# Patient Record
Sex: FEMALE | Employment: OTHER | URBAN - METROPOLITAN AREA
[De-identification: names, ages, dates, MRNs, and addresses within clinical notes are randomized per-mention and may not be internally consistent; named-entity substitution may affect disease eponyms.]

---

## 2018-05-17 ENCOUNTER — OFFICE VISIT (OUTPATIENT)
Dept: INTERNAL MEDICINE | Facility: CLINIC | Age: 73
End: 2018-05-17
Payer: COMMERCIAL

## 2018-05-17 VITALS
HEART RATE: 56 BPM | OXYGEN SATURATION: 97 % | WEIGHT: 128.3 LBS | SYSTOLIC BLOOD PRESSURE: 112 MMHG | DIASTOLIC BLOOD PRESSURE: 67 MMHG

## 2018-05-17 DIAGNOSIS — I10 BENIGN ESSENTIAL HYPERTENSION: ICD-10-CM

## 2018-05-17 DIAGNOSIS — Z00.00 ENCOUNTER FOR HEALTH MAINTENANCE EXAMINATION: ICD-10-CM

## 2018-05-17 DIAGNOSIS — Z79.4 TYPE 2 DIABETES MELLITUS WITHOUT COMPLICATION, WITH LONG-TERM CURRENT USE OF INSULIN (H): Primary | ICD-10-CM

## 2018-05-17 DIAGNOSIS — E11.9 TYPE 2 DIABETES MELLITUS WITHOUT COMPLICATION, WITH LONG-TERM CURRENT USE OF INSULIN (H): Primary | ICD-10-CM

## 2018-05-17 RX ORDER — ASPIRIN 81 MG/1
81 TABLET ORAL
COMMUNITY
Start: 2018-04-18

## 2018-05-17 RX ORDER — METOPROLOL TARTRATE 50 MG
50 TABLET ORAL
COMMUNITY
Start: 2018-03-03 | End: 2018-06-20

## 2018-05-17 RX ORDER — ERGOCALCIFEROL 1.25 MG/1
50000 CAPSULE, LIQUID FILLED ORAL
COMMUNITY
Start: 2017-05-25 | End: 2021-04-16

## 2018-05-17 RX ORDER — LOSARTAN POTASSIUM 25 MG/1
25 TABLET ORAL
COMMUNITY
Start: 2018-04-04 | End: 2019-08-08

## 2018-05-17 RX ORDER — ATORVASTATIN CALCIUM 10 MG/1
10 TABLET, FILM COATED ORAL
COMMUNITY
Start: 2018-04-04 | End: 2019-08-08

## 2018-05-17 RX ORDER — GLIMEPIRIDE 1 MG/1
1 TABLET ORAL
COMMUNITY
Start: 2018-04-04 | End: 2018-06-20

## 2018-05-17 RX ORDER — HYDROCHLOROTHIAZIDE 25 MG/1
12.5 TABLET ORAL
COMMUNITY
Start: 2018-03-03 | End: 2018-06-20

## 2018-05-17 ASSESSMENT — PAIN SCALES - GENERAL: PAINLEVEL: MODERATE PAIN (5)

## 2018-05-17 NOTE — NURSING NOTE
Chief Complaint   Patient presents with     Establish Care     Patient is here to establish a new PCP.      Susan Johns LPN at 1:03 PM on 5/17/2018.

## 2018-05-17 NOTE — PATIENT INSTRUCTIONS
It was a pleasure seeing you in clinic. Today we discussed:   1) For your diabetes - please continue to take 1 mg of Glimepride and metformin. Please measure your blood sugars in the morning, 1-2 hours after meals and at nightime before bed.     2) Please stop taking hydrochlorothiazide. You will need to measure your blood pressure twice daily - once in the morning and once at night. Please measure the blood pressure 1-2 hours after you take your morning Losartan medication.     3) I would like you to return to visit in 2--3 weeks with labs. At this time we will consider a cognitive assessment as well as further management of your appetite and BP and diabetes.     Sincerely,  Kristen Mcallister     Please follow up with me in 2-3 weeks with labs

## 2018-05-17 NOTE — PROGRESS NOTES
PRIMARY CARE CENTER         HPI:       Shirley Lara is a 72 year old female with medical history of diabetes and hypertension who presents to establish care     1) Poor appetite   Patient is accompanied by her daughter who states she is simply not interested in eating food any longer. Denies constipation, burning or GERD like symptoms. No significant weight loss as far as family is aware although have not been measuring weight consistently. Not a smoker r ETOH user and no B symptoms appreciable either per patient.   She states when food appears, she is able to eat small quantities however not large as her family expects.   This AM for example, ate an egg and toast and then was full. Usually does not eat a large amount throughout the remainder of the day.     2) The family wishes to review her current medical conditions.     Past Medical History:   Diagnosis Date     Hyperlipidemia      Hypertension      T2DM (type 2 diabetes mellitus) (H)        History reviewed. No pertinent surgical history.    Family History   Problem Relation Age of Onset     Family history unknown: Yes       Social History   Substance Use Topics     Smoking status: Not on file     Smokeless tobacco: Not on file     Alcohol use Not on file     Current Medications:   Metformin 100mg BID, AMaryl 2mg PO once daily   Lipitor 10mg PO Once daily   HCTZ 12.5mg Qd, Cozaar 25mg Qday, Lopressor 50mg 1 tab BID   Vitamin D   Aspirin 81mg Q Day     Medications and Allergies reviewed.          Review of Systems:   Review Of Systems  Skin: negative  Eyes: negative  Ears/Nose/Throat: negative for, nasal congestion, sneezing, postnasal drainage, hearing loss  Respiratory: No shortness of breath, dyspnea on exertion, cough, or hemoptysis  Cardiovascular: negative for, palpitations, irregular heart beat and chest pain  Gastrointestinal: poor appetite  Genitourinary: negative for, dysuria, frequency and urgency  Musculoskeletal: positive for back pain,  arthritis, joint pain and joint stiffness  Neurologic: negative  Psychiatric: negative  Hematologic/Lymphatic/Immunologic: negative  Endocrine: negative            Physical Exam:   /67  Pulse 56  Wt 58.2 kg (128 lb 4.8 oz)  SpO2 97%  There is no height or weight on file to calculate BMI.  Vitals were reviewed.     Gen: In no acute distress. Patient comfortably sitting on exam table in clinic.   HEENT: No scleral icterus, Moist mucous membranes. No nasal discharge.  CV: Normal rate, regular rhythm. S1/S2 normal.  No murmurs, rubs or gallops   Resp: Clear to auscultation bilaterally. Without wheeze or crackles  Abdomen: Soft, non tender abdomen, normal active bowel sounds,   Extremities: No peripheral edema, warm, capillary refill < 2 seconds  Skin: without rash or trauma on exposed areas.   Neuro:   Oriented to - Situation Able to ambulate appropriately without gait imbalance within exam room. Ab le to get up from chair with ease without use of upper extremities       Results:     Chart Reviewed     Assessment and Plan     Shirley was seen today for establish care.    Diagnoses and all orders for this visit:    Type 2 diabetes mellitus without complication, with long-term current use of insulin (H)  Currently glucose levels appear elevated in PM per home records. Family has been diligent with respect to measuring blood glucose before and after meals as well as at bedtime. Bedtime values appear in low 300s and AM values at 160-200.   Is on Metformin as well as Glimepride recently decreased to 1mg given symptomatic dizziness and hypoglycemia.   Will not make further changes at this time, Recommend continuing current agents. Will readdress at next visit. Lindy that patient's ethnic foods cause component of hyperglycemia as well. Last saw Diabetes education in 2017.   -     DIABETES EDUCATOR REFERRAL    Benign essential hypertension  BP appears low today and in combination with symptomatic Dizziness, will stop  HCTZ and continue remainder of anti HTN agents.   - Asked family to measure B BID daily and record and bring to next appointment   -     Basic metabolic panel; Future  -     Magnesium; Future    Encounter for health maintenance examination  -     CBC with platelets; Future    Options for treatment and follow-up care were reviewed with the patient. Shirley Lara engaged in the decision making process and verbalized understanding of the options discussed and agreed with the final plan.    Kristen Mcallister MD  May 17, 2018    Pt was discussed with     While the patient was in clinic, I reviewed the pertinent medical history and results.  I discussed the current findings on physical examination, as well as the patient s diagnosis and treatment plan with the resident and agree with the information as documented with the following exceptions: none.  Sophie Jones MD

## 2018-06-01 ENCOUNTER — OFFICE VISIT (OUTPATIENT)
Dept: INTERNAL MEDICINE | Facility: CLINIC | Age: 73
End: 2018-06-01
Payer: COMMERCIAL

## 2018-06-01 VITALS
SYSTOLIC BLOOD PRESSURE: 152 MMHG | HEART RATE: 60 BPM | OXYGEN SATURATION: 99 % | WEIGHT: 131 LBS | DIASTOLIC BLOOD PRESSURE: 77 MMHG

## 2018-06-01 DIAGNOSIS — E11.9 TYPE 2 DIABETES MELLITUS WITHOUT COMPLICATION, WITHOUT LONG-TERM CURRENT USE OF INSULIN (H): ICD-10-CM

## 2018-06-01 DIAGNOSIS — Z11.59 NEED FOR HEPATITIS C SCREENING TEST: ICD-10-CM

## 2018-06-01 DIAGNOSIS — I10 BENIGN ESSENTIAL HYPERTENSION: ICD-10-CM

## 2018-06-01 DIAGNOSIS — Z00.00 ENCOUNTER FOR HEALTH MAINTENANCE EXAMINATION: ICD-10-CM

## 2018-06-01 DIAGNOSIS — D64.9 LOW HEMOGLOBIN: ICD-10-CM

## 2018-06-01 DIAGNOSIS — R63.0 LOSS OF APPETITE: Primary | ICD-10-CM

## 2018-06-01 DIAGNOSIS — R63.0 LOSS OF APPETITE: ICD-10-CM

## 2018-06-01 PROBLEM — E78.5 HYPERLIPIDEMIA, UNSPECIFIED: Status: ACTIVE | Noted: 2017-05-25

## 2018-06-01 PROBLEM — E78.5 HYPERLIPIDEMIA LDL GOAL <100: Status: ACTIVE | Noted: 2018-06-01

## 2018-06-01 PROBLEM — E78.5 HYPERLIPIDEMIA, UNSPECIFIED: Status: RESOLVED | Noted: 2017-05-25 | Resolved: 2018-06-01

## 2018-06-01 LAB
ALBUMIN SERPL-MCNC: 3.6 G/DL (ref 3.4–5)
ALP SERPL-CCNC: 70 U/L (ref 40–150)
ALT SERPL W P-5'-P-CCNC: 25 U/L (ref 0–50)
ANION GAP SERPL CALCULATED.3IONS-SCNC: 8 MMOL/L (ref 3–14)
AST SERPL W P-5'-P-CCNC: 13 U/L (ref 0–45)
BILIRUB DIRECT SERPL-MCNC: <0.1 MG/DL (ref 0–0.2)
BILIRUB SERPL-MCNC: 0.4 MG/DL (ref 0.2–1.3)
BUN SERPL-MCNC: 23 MG/DL (ref 7–30)
CALCIUM SERPL-MCNC: 9.5 MG/DL (ref 8.5–10.1)
CHLORIDE SERPL-SCNC: 104 MMOL/L (ref 94–109)
CO2 SERPL-SCNC: 27 MMOL/L (ref 20–32)
CREAT SERPL-MCNC: 0.92 MG/DL (ref 0.52–1.04)
ERYTHROCYTE [DISTWIDTH] IN BLOOD BY AUTOMATED COUNT: 13.2 % (ref 10–15)
GFR SERPL CREATININE-BSD FRML MDRD: 60 ML/MIN/1.7M2
GLUCOSE SERPL-MCNC: 143 MG/DL (ref 70–99)
HCT VFR BLD AUTO: 31.5 % (ref 35–47)
HGB BLD-MCNC: 10.4 G/DL (ref 11.7–15.7)
MAGNESIUM SERPL-MCNC: 1.6 MG/DL (ref 1.6–2.3)
MCH RBC QN AUTO: 28.3 PG (ref 26.5–33)
MCHC RBC AUTO-ENTMCNC: 33 G/DL (ref 31.5–36.5)
MCV RBC AUTO: 86 FL (ref 78–100)
PLATELET # BLD AUTO: 199 10E9/L (ref 150–450)
POTASSIUM SERPL-SCNC: 4.3 MMOL/L (ref 3.4–5.3)
PROT SERPL-MCNC: 6.8 G/DL (ref 6.8–8.8)
RBC # BLD AUTO: 3.67 10E12/L (ref 3.8–5.2)
SODIUM SERPL-SCNC: 139 MMOL/L (ref 133–144)
TSH SERPL DL<=0.005 MIU/L-ACNC: 2.44 MU/L (ref 0.4–4)
WBC # BLD AUTO: 5.3 10E9/L (ref 4–11)

## 2018-06-01 ASSESSMENT — PAIN SCALES - GENERAL: PAINLEVEL: MILD PAIN (2)

## 2018-06-01 NOTE — PROGRESS NOTES
"[facility]  Primary Care [place]   Angie NAMJesenia Jaircassandraleonardo, RAMONA CNP  06/01/2018      Chief Complaint:   Results and Diabetes       History of Present Illness: The patient's clinic visit was completed with the assistance of a [place] .  Shirley Lara is a 72 year old female with a history of type 2 diabetes, hypertension, and hyperlipidemia, who presents with her daughter for laboratory results and diabetes follow up.    Blood pressure- At her visit on 05/17/2018, her blood pressure was at 112/67, therefore Dr. Duran advised her to discontinue hydrochlorothiazide which had been causing excess urination as a side affect. The patient is regularly checking her blood pressure at home. Her current systolic pressure range is 130s-180 with an average in the 140s-150s. Her diastolic pressure has been in the range of 62-86 with an average in the 60's. Of note, the patient is still taking 25 mg losartan as well as 50 mg metoprolol.     Appetite- She is experiencing a lack of appetite which she associates with worry and anxiety about her blood sugar levels. The patient reports feeling nauseous and having abdominal cramping with \"colic.\" She does have normal bowel movements, and denies hard stool. She also denies burning/acid reflux as well as urinary symptoms. Denies weight loss.    Blood Glucose- The patient's HbA1c was found to be 6.5 in April 2018. Her glimepiride prescription was recently decreased from 2 mg to 1 mg. She has been consistently recording her blood glucose levels at home, which are about 119-160 before breakfast, 212 before lunch, 133 after lunch, and 227-315 at bedtime. The patient is to see Loulou Romero of diabetes education on 06/18/2018.    Hemoglobin- Today, the patients hemoglobin is low at 10.4. She is complaining of being cold and reports having dizzy episodes described as lightheadedness that onset suddenly. During one of these \"dizzy\" episodes, the patient fell in the restroom " which resulted in bruising her right ankle.    Memory loss- Her daughter reports that the patient is showing symptoms of memory loss. Shirley states that she just doesn't like to talk very much, and that is just who she is.    Health Care Maintenance/Other:  1. Occasional headaches   2. Occasional yellowing of the eyes per daughter's report  3. Laboratory studies completed prior to visit    Review of Systems:   Pertinent items are noted in HPI, remainder of complete ROS is negative.      Active Medications:      aspirin 81 MG EC tablet, Take 81 mg by mouth, Disp: , Rfl:      atorvastatin (LIPITOR) 10 MG tablet, Take 10 mg by mouth, Disp: , Rfl:      glimepiride (AMARYL) 1 MG tablet, Take 1 mg by mouth, Disp: , Rfl:      hydrochlorothiazide (HYDRODIURIL) 25 MG tablet, Take 12.5 mg by mouth, Disp: , Rfl:      losartan (COZAAR) 25 MG tablet, Take 25 mg by mouth, Disp: , Rfl:      metFORMIN (GLUCOPHAGE) 500 MG tablet, Take 1,000 mg by mouth, Disp: , Rfl:      metoprolol tartrate (LOPRESSOR) 50 MG tablet, Take 50 mg by mouth, Disp: , Rfl:      vitamin D (ERGOCALCIFEROL) 65540 UNIT capsule, Take 50,000 Units by mouth, Disp: , Rfl:       Allergies:   No known allergies     Past Medical History:  Type 2 diabetes mellitus   Hyperlipidemia    Benign essential hypertension      Past Surgical History:  Hernia repair    Family History:   The patient's family history is unknown as she is adopted.      Social History:   The patient is  and has one daughter.       Physical Exam:   /77  Pulse 60  Wt 59.4 kg (131 lb)  SpO2 99%  Breastfeeding? No      Constitutional: Alert, oriented, pleasant, no acute distress  Head: Normocephalic, atraumatic  Eyes: Extra-ocular movements intact, pupils equally round and reactive bilaterally, no scleral icterus  Ears: tympanic membranes pearly gray with positive light reflex  ENT: Oropharynx clear, moist mucus membranes, wears upper dentures.   Neck: Supple, no lymphadenopathy, no  "thyromegaly  Cardiovascular: Regular rate and rhythm, no murmurs, rubs or gallops, peripheral pulses full/symmetric  Respiratory: Good air movement bilaterally, lungs clear, no wheezes/rales/rhonchi  GI: Abdomen soft, bowel sounds present, nondistended, nontender, no organomegaly or masses, no rebound/guarding  Neurologic: Alert and oriented, cranial nerves grossly intact.  Psychiatric: normal mentation, affect and mood    Laboratory Results:  Component      Latest Ref Rng & Units 6/1/2018   Sodium      133 - 144 mmol/L 139   Potassium      3.4 - 5.3 mmol/L 4.3   Chloride      94 - 109 mmol/L 104   Carbon Dioxide      20 - 32 mmol/L 27   Anion Gap      3 - 14 mmol/L 8   Glucose      70 - 99 mg/dL 143 (H)   Urea Nitrogen      7 - 30 mg/dL 23   Creatinine      0.52 - 1.04 mg/dL 0.92   GFR Estimate      >60 mL/min/1.7m2 60 (L)   GFR Estimate If Black      >60 mL/min/1.7m2 72   Calcium      8.5 - 10.1 mg/dL 9.5   WBC      4.0 - 11.0 10e9/L 5.3   RBC Count      3.8 - 5.2 10e12/L 3.67 (L)   Hemoglobin      11.7 - 15.7 g/dL 10.4 (L)   Hematocrit      35.0 - 47.0 % 31.5 (L)   MCV      78 - 100 fl 86   MCH      26.5 - 33.0 pg 28.3   MCHC      31.5 - 36.5 g/dL 33.0   RDW      10.0 - 15.0 % 13.2   Platelet Count      150 - 450 10e9/L 199   Bilirubin Direct      0.0 - 0.2 mg/dL <0.1   Bilirubin Total      0.2 - 1.3 mg/dL 0.4   Albumin      3.4 - 5.0 g/dL 3.6   Protein Total      6.8 - 8.8 g/dL 6.8   Alkaline Phosphatase      40 - 150 U/L 70   ALT      0 - 50 U/L 25   AST      0 - 45 U/L 13   Magnesium      1.6 - 2.3 mg/dL 1.6   TSH      0.40 - 4.00 mU/L 2.44        Assessment and Plan:  1. Loss of appetite  Patient has experienced a decreased appetite with decreased PO intake to avoid elevated blood glucose levels. She has experienced abdominal cramping with nausea and \"colic\" as well , so hepatic panel and TSH were added on to earlier labs for further evaluation of underlying cause.   - Hepatic panel; Future  - TSH with free " T4 reflex; Future    2. Low hemoglobin  Patient hemoglobin prior to the appointment was found to be low at 10.4. Declines colonoscopy at this point. Due to recent lightheadedness and concern for anemia, FIT testing was ordered for evaluate for bloody stools.   - Fecal cancer screen FIT; Future    3. Need for hepatitis C screening test  - HCV Screen with Reflex; Future    4. Type 2 diabetes mellitus without complication, without long-term current use of insulin (H)  Patient has experienced a range of values with an attempt to control with glimepiride. Patient has been decreasing her PO intake to avoid elevated levels. Reviewed importance of regular meals or small snacks if not feeling hungry. No documented low BGs, no s/s hypoglycemia.    5. Elevated blood pressure   Patient was recently taken off of hydrochlorothiazide as her blood pressure appeared well controlled and she did not like the excessive urination. Her blood pressure was elevated on presentation today and has been slightly elevated while monitoring at home. As she has been experiencing lightheadedness recently, will hold off on increasing her losartan at this time. Patient should continue to monitor these levels at home with the goal of ~130s/70s.      Follow-up: in 2-3 weeks        Scribe Disclosure:  We, Maryjo Clayton and Daphnie Glasgow, are serving as the scribes to document services personally performed by RAMONA Rothman CNP at this visit, based upon the provider's statements to me. All documentation has been reviewed by the aforementioned provider prior to being entered into the official medical record.     Portions of this medical record were completed by a scribe. UPON MY REVIEW AND AUTHENTICATION BY ELECTRONIC SIGNATURE, this confirms (a) I performed the applicable clinical services, and (b) the record is accurate.     RAMONA Rothman CNP

## 2018-06-01 NOTE — NURSING NOTE
Chief Complaint   Patient presents with     Results     Pt. is here to f/u on labs.     Diabetes     f/u.     Annia Khan

## 2018-06-01 NOTE — MR AVS SNAPSHOT
After Visit Summary   6/1/2018    Shirley Lara    MRN: [id number]           Patient Information     Date Of Birth          1945        Visit Information        Provider Department      6/1/2018 2:15 PM Andrea Chapa Jennie Lynn, APRN [facility]        Today's Diagnoses     Loss of appetite    -  1    Low hemoglobin        Need for hepatitis C screening test        Type 2 diabetes mellitus without complication, without long-term current use of insulin (H)          Care Instructions    Primary Care Center Phone Number [phone]  Primary Care Center Medication Refill Request Information:  * Please contact your pharmacy regarding ANY request for medication refills.  ** [facility] Prescription Fax = [phone]  * Please allow 3 business days for routine medication refills.  * Please allow 5 business days for controlled substance medication refills.     Primary Care Center Test Result notification information:  *You will be notified with in 7-10 days of your appointment day regarding the results of your test.  If you are on MyChart you will be notified as soon as the provider has reviewed the results and signed off on them.                    Follow-ups after your visit        Your next 10 appointments already scheduled     Jun 18, 2018  2:00 PM CDT   LAB with UC LAB   [facility] ([facility])    [address] [place] [address] [phone]           Please do not eat 10-12 hours before your appointment if you are coming in fasting for labs on lipids, cholesterol, or glucose (sugar). This does not apply to pregnant women. Water, hot tea and black coffee (with nothing added) are okay. Do not drink other fluids, diet soda or chew gum.            Jun 18, 2018  2:30 PM CDT   (Arrive by 2:15 PM)   Office Visit with Jacqueline Romero RD   [facility] ([facility])    [address] [place] [address]  Floor  [facility] [address] [phone]           Bring a current list of meds and any records pertaining to this visit. For Physicals, please bring immunization records and any forms needing to be filled out. Please arrive 10 minutes early to complete paperwork.            2018  2:30 PM CDT   (Arrive by 2:15 PM)   Return Visit with RAMONA [facility] ([facility])    [address] [facility] [address] [facility]  4th Floor  [facility] [address] [phone]              Future tests that were ordered for you today     Open Future Orders        Priority Expected Expires Ordered    HCV Screen with Reflex Add-On 2018    Fecal cancer screen FIT Routine 2018            Who to contact     Please call your clinic at [phone] to:    Ask questions about your health    Make or cancel appointments    Discuss your medicines    Learn about your test results    Speak to your doctor            Additional Information About Your Visit        [employer] Information     [employer] is an electronic gateway that provides easy, online access to your medical records. With [employer], you can request a clinic appointment, read your test results, renew a prescription or communicate with your care team.     To sign up for [employer] visit the website at [email or web address]   You will be asked to enter the access code listed below, as well as some personal information. Please follow the directions to create your username and password.     Your access code is: [email or web address]  Expires: 2018  3:40 PM     Your access code will  in 90 days. If you need help or a new code, please contact your [facility] or call [phone] for assistance.        Care EveryWhere ID     This is your Care EveryWhere ID. This could be used by other organizations to access your [place] medical records  [id number]         Your Vitals Were     Pulse Pulse Oximetry Breastfeeding?             60 99% No          Blood Pressure from Last 3 Encounters:   06/01/18 152/77   05/17/18 112/67    Weight from Last 3 Encounters:   06/01/18 59.4 kg (131 lb)   05/17/18 58.2 kg (128 lb 4.8 oz)               Primary Care Provider Office Phone # Fax #    Kristen Mcallister [phone]       [place] [address]        Equal Access to Services     YOESLYN PARHAM : Hadii aad ku hadasho Soomaali, waaxda luqadaha, qaybta kaalmada adeegyada, waxdre jauregui hayadin benson . So [place] -[phone].    ATENCIÓN: Si habla español, tiene a stout disposición servicios gratuitos de asistencia lingüística. [facility] [phone].    We comply with applicable federal civil rights laws and [place] laws. We do not discriminate on the basis of race, color, national origin, age, disability, sex, sexual orientation, or gender identity.            Thank you!     Thank you for choosing [facility]  for your care. Our goal is always to provide you with excellent care. Hearing back from our patients is one way we can continue to improve our services. Please take a few minutes to complete the written survey that you may receive in the mail after your visit with us. Thank you!             Your Updated Medication List - Protect others around you: Learn how to safely use, store and throw away your medicines at www.disposemymeds.org.          This list is accurate as of 6/1/18  3:40 PM.  Always use your most recent med list.                   Brand Name Dispense Instructions for use Diagnosis    aspirin 81 MG EC tablet      Take 81 mg by mouth        atorvastatin 10 MG tablet    LIPITOR     Take 10 mg by mouth        glimepiride 1 MG tablet    AMARYL     Take 1 mg by mouth        hydrochlorothiazide 25 MG tablet    HYDRODIURIL     Take 12.5 mg by mouth        losartan 25 MG tablet    COZAAR     Take 25 mg  by mouth        metFORMIN 500 MG tablet    GLUCOPHAGE     Take 1,000 mg by mouth        metoprolol tartrate 50 MG tablet    LOPRESSOR     Take 50 mg by mouth        vitamin D 79891 UNIT capsule    ERGOCALCIFEROL     Take 50,000 Units by mouth

## 2018-06-01 NOTE — PATIENT INSTRUCTIONS
Primary Care Center Phone Number [phone]  Primary Care Center Medication Refill Request Information:  * Please contact your pharmacy regarding ANY request for medication refills.  ** [facility] Prescription Fax = [phone]  * Please allow 3 business days for routine medication refills.  * Please allow 5 business days for controlled substance medication refills.     Primary Care Center Test Result notification information:  *You will be notified with in 7-10 days of your appointment day regarding the results of your test.  If you are on MyChart you will be notified as soon as the provider has reviewed the results and signed off on them.

## 2018-06-07 ENCOUNTER — HOSPITAL ENCOUNTER (OUTPATIENT)
Facility: CLINIC | Age: 73
Setting detail: SPECIMEN
Discharge: HOME | End: 2018-06-07
Admitting: NURSE PRACTITIONER
Payer: COMMERCIAL

## 2018-06-07 PROCEDURE — 82274 ASSAY TEST FOR BLOOD FECAL: CPT | Performed by: NURSE PRACTITIONER

## 2018-06-09 DIAGNOSIS — D64.9 LOW HEMOGLOBIN: ICD-10-CM

## 2018-06-09 LAB — HEMOCCULT STL QL IA: NEGATIVE

## 2018-06-18 ENCOUNTER — OFFICE VISIT (OUTPATIENT)
Dept: EDUCATION SERVICES | Facility: CLINIC | Age: 73
End: 2018-06-18
Attending: INTERNAL MEDICINE
Payer: COMMERCIAL

## 2018-06-18 VITALS — HEIGHT: 57 IN | WEIGHT: 131.1 LBS | BODY MASS INDEX: 28.29 KG/M2

## 2018-06-18 DIAGNOSIS — E11.9 DIABETES MELLITUS WITHOUT COMPLICATION (H): Primary | ICD-10-CM

## 2018-06-18 DIAGNOSIS — Z11.59 NEED FOR HEPATITIS C SCREENING TEST: ICD-10-CM

## 2018-06-18 NOTE — MR AVS SNAPSHOT
After Visit Summary   6/18/2018    Shirley Lara    MRN: [id number]           Patient Information     Date Of Birth          1945        Visit Information        Provider Department      6/18/2018 2:15 PM Jacqueline Romero RD; [employer] [facility] Diabetes        Care Instructions    1. Take your diabetes medication with food, not on an empty stomach to prevent nausea  2. Make sure to drink plenty of water throughout the day, at least 6 cups per day  3. Eat regular meals with your family, you should be eating normal foods and not avoid any healthy foods such as grains or fruits because of your blood sugar.  4. You most likely need an increase in your diabetes medications. Discuss this with Montserrat Bragg NP in a couple days. Make sure you bring your blood sugar logbook with you to this appointment.  5. Follow up as needed  Jacqueline Romero RD, LD, CDE  Diabetes Care  [address]  [address] [place] [address]  [address]  Phone: [phone]  Appointment line: [phone]  Email: [email or web address]              Follow-ups after your visit        Your next 10 appointments already scheduled     Jun 20, 2018  2:15 PM CDT   Return Visit with RAMONA Cee [facility] ([facility])    [address] [place] [address] [phone]              Who to contact     Please call your clinic at [phone] to:    Ask questions about your health    Make or cancel appointments    Discuss your medicines    Learn about your test results    Speak to your doctor            Additional Information About Your Visit        MyChart Information     Cloudbot is an electronic gateway that provides easy, online access to your medical records. With Cloudbot, you can request a clinic appointment, read your test results, renew a prescription or communicate with your care team.      To sign up for TuneUp visit the website at [email or web address]   You will be asked to enter the access code listed below, as well as some personal information. Please follow the directions to create your username and password.     Your access code is: [email or web address]  Expires: 2018  3:40 PM     Your access code will  in 90 days. If you need help or a new code, please contact your [facility] or call [phone] for assistance.        Care EveryWhere ID     This is your Care EveryWhere ID. This could be used by other organizations to access your [place] medical records  [id number]         Blood Pressure from Last 3 Encounters:   18 152/77   18 112/67    Weight from Last 3 Encounters:   18 59.4 kg (131 lb)   18 58.2 kg (128 lb 4.8 oz)              Today, you had the following     No orders found for display       Primary Care Provider Office Phone # Fax #    Kristen Mcallister [phone]       [place] [address]        Equal Access to Services     YOSELYN PARHAM AH: Hadii lou fryeo Sonathaniel, waaxda luqadaha, qaybta kaalmada ademonicayada, zora pro. So [facility] [phone].    ATENCIÓN: Si habla español, tiene a stout disposición servicios gratuitos de asistencia lingüística. Luis al [phone].    We comply with applicable federal civil rights laws and [place] laws. We do not discriminate on the basis of race, color, national origin, age, disability, sex, sexual orientation, or gender identity.            Thank you!     Thank you for choosing [facility]  for your care. Our goal is always to provide you with excellent care. Hearing back from our patients is one way we can continue to improve our services. Please take a few minutes to complete the written survey that you may receive in the mail after your visit with us. Thank you!             Your Updated  Medication List - Protect others around you: Learn how to safely use, store and throw away your medicines at www.disposemymeds.org.          This list is accurate as of 6/18/18  3:38 PM.  Always use your most recent med list.                   Brand Name Dispense Instructions for use Diagnosis    aspirin 81 MG EC tablet      Take 81 mg by mouth        atorvastatin 10 MG tablet    LIPITOR     Take 10 mg by mouth        glimepiride 1 MG tablet    AMARYL     Take 1 mg by mouth        hydrochlorothiazide 25 MG tablet    HYDRODIURIL     Take 12.5 mg by mouth        losartan 25 MG tablet    COZAAR     Take 25 mg by mouth        metFORMIN 500 MG tablet    GLUCOPHAGE     Take 1,000 mg by mouth        metoprolol tartrate 50 MG tablet    LOPRESSOR     Take 50 mg by mouth        vitamin D 19219 UNIT capsule    ERGOCALCIFEROL     Take 50,000 Units by mouth

## 2018-06-18 NOTE — PROGRESS NOTES
"  Diabetes Self Management Training: Individual Review Visit    Shirley Lara presents today for education and evaluation of glucose control related to Type 2 diabetes.    She is accompanied by daughter and     Patient Problem List and Family Medical History reviewed for relevant medical history, current medical status, and diabetes risk factors.    Current Diabetes Management per Patient:  Taking diabetes medications?   yes:     Diabetes Medication(s)     Biguanides Sig    metFORMIN (GLUCOPHAGE) 500 MG tablet Take 1,000 mg by mouth    Sulfonylureas Sig    glimepiride (AMARYL) 1 MG tablet Take 1 mg by mouth          Past Diabetes Education: Yes    Patient glucose self monitoring as follows: 1-2 times daily.   BG results: fasting glucose- 154, post-breakfast glucose- 344, pre-lunch glucose- 210, 153, 226, 168, 215, post-lunch glucose- 253, 125, 195, pre-supper glucose- 173, post-supper glucose- 239 and bedtime- 273, 334, 220     Vitals:  Ht 1.454 m (4' 9.24\")  Wt 59.5 kg (131 lb 1.6 oz)  BMI 28.13 kg/m2  Estimated body mass index is 28.13 kg/(m^2) as calculated from the following:    Height as of this encounter: 1.454 m (4' 9.24\").    Weight as of this encounter: 59.5 kg (131 lb 1.6 oz).   Last 3 BP:   BP Readings from Last 3 Encounters:   06/01/18 152/77   05/17/18 112/67     History   Smoking Status     Not on file   Smokeless Tobacco     Not on file       Labs:  No results found for: A1C  Lab Results   Component Value Date     06/01/2018     No results found for: LDL  No results found for: HDL]  GFR Estimate   Date Value Ref Range Status   06/01/2018 60 (L) >60 mL/min/1.7m2 Final     Comment:     Non  GFR Calc     GFR Estimate If Black   Date Value Ref Range Status   06/01/2018 72 >60 mL/min/1.7m2 Final     Comment:      GFR Calc     Lab Results   Component Value Date    CR 0.92 06/01/2018     No results found for: MICROALBUMIN    Nutrition Review:  Shirley " is here today with her daughter and the [facility] . I have read her PMH. She has had her type 2 diabetes approx 10 years. [place] has been in the [place] 2[place] 5 years and lives with her  and daughter and family. Her daughter tells me today she is very worried about her appetite, and tells me she hardly eats anything. She tells me she thinks her weight has remained stable however for the past 2 years. She tells me her mother is worried that if she eats, her bg rise, in addition she tells me her mother is nauseated and dizzy often and this is another reason she does not eat. Her mother frequently takes her diabetes meds on an empty stomach. Shirley's other daughter does all of the cooking and prepares traditional meals for the family consisting of meat/chicken/veg/rice/soup but Shirley does not eat much of it. The daughter who is here today also tells me that her mother's memory is getting worse and she may not remember if she ate or not.    Diet Recall:   Breakfast:1 egg, tea with 1 tablespoon sugar or rarely 1/2 armando with cream/honey  AM snack:none  Lunch:skips or eats only a small amount of meat/chicken in sauce, sometimes yogurt,strawberries, cherries  Dinner:tea with 1 tablespoon sugar, sometimes cheese or egg    Eats out in restaurants: about never  Beverages: tea, water  ETOH: none     Physical Activity:    Not assessed.      Reviewed many things today in addition to current diet.   1. Shirley should be eating regular meals with the family. There is nothing she cannot eat because of her diabetes  2. She likely  Needs an increase in her diabetes medication as her bg are mostly above target range. This is normal for someone who has had diabetes for 10 years. She should discuss this with Anna Bragg in 2 days when she sees her.  3. Shirley needs to take her diabetes meds with food, as this may be contributing to nausea at times.  4. She needs to drink plenty of fluids throughout the day to prevent  dehydration resulting in dizziness. 6 cups/day.    All of this was discussed with Shirley and the daughter today in addition to that Shirley should follow up with the NP regarding her memory loss to see if this is an issue impacting her poor appetite.     Education provided today on:  AADE Self-Care Behaviors:  Healthy Eating: eat normal meals, all traditional foods served as reviewed today are appropriate  Taking Medication: medication increase is warranted, Shirley will f/u with Anna Bragg in 2 days    Pt verbalized understanding of concepts discussed and recommendations provided today.         ASSESSMENT: It is unclear why Shirley's appetite is poor, it could be due to poor memory, nausea, dizziness combined with fear of high blood sugars. She needs an increase in her diabetes meds, which I will defer to Anna Brgag as Shirley sees her in 2 days. Shirley's diet recall today is not consistent with weight maintenance. She is very likely eating more than is reported as it would be impossible to maintain her current weight with her current diet as the estimated calories are approx 600 per day, and she would losing weight rapidly if she were only eating 600 calories/per day. She may be eating more and not remembering. I will task this note to Anna Bragg today.       PLAN:  See Patient Instructions for co-developed, patient-stated behavior change goals.  AVS printed and provided to patient today.    FOLLOW-UP:  Follow-up as needed.   Chart routed to referring provider.    Time Spent: 60 minutes  Encounter Type: Individual    Any diabetes medication dose changes were made via the CDE Protocol and Collaborative Practice Agreement with the patient's referring provider. A copy of this encounter was shared with the provider.

## 2018-06-18 NOTE — PATIENT INSTRUCTIONS
1. Take your diabetes medication with food, not on an empty stomach to prevent nausea  2. Make sure to drink plenty of water throughout the day, at least 6 cups per day  3. Eat regular meals with your family, you should be eating normal foods and not avoid any healthy foods such as grains or fruits because of your blood sugar.  4. You most likely need an increase in your diabetes medications. Discuss this with Montserrat Bragg NP in a couple days. Make sure you bring your blood sugar logbook with you to this appointment.  5. Follow up as needed  Jacqueline Romero RD, LD, CDE  Diabetes Care  [address]  [address] [place] [address]  [address]  Phone: [phone]  Appointment line: [phone]  Email: [email or web address]

## 2018-06-19 LAB — HCV AB SERPL QL IA: NONREACTIVE

## 2018-06-20 ENCOUNTER — OFFICE VISIT (OUTPATIENT)
Dept: INTERNAL MEDICINE | Facility: CLINIC | Age: 73
End: 2018-06-20
Payer: COMMERCIAL

## 2018-06-20 VITALS
DIASTOLIC BLOOD PRESSURE: 74 MMHG | BODY MASS INDEX: 28.21 KG/M2 | HEART RATE: 54 BPM | SYSTOLIC BLOOD PRESSURE: 151 MMHG | WEIGHT: 131.5 LBS | OXYGEN SATURATION: 100 %

## 2018-06-20 DIAGNOSIS — H81.10 BENIGN PAROXYSMAL POSITIONAL VERTIGO, UNSPECIFIED LATERALITY: ICD-10-CM

## 2018-06-20 DIAGNOSIS — G44.211 INTRACTABLE EPISODIC TENSION-TYPE HEADACHE: ICD-10-CM

## 2018-06-20 DIAGNOSIS — E11.9 TYPE 2 DIABETES MELLITUS WITHOUT COMPLICATION, WITHOUT LONG-TERM CURRENT USE OF INSULIN (H): ICD-10-CM

## 2018-06-20 DIAGNOSIS — I10 ESSENTIAL HYPERTENSION: ICD-10-CM

## 2018-06-20 DIAGNOSIS — Z91.81 PERSONAL HISTORY OF FALL: ICD-10-CM

## 2018-06-20 DIAGNOSIS — R11.0 NAUSEA: ICD-10-CM

## 2018-06-20 DIAGNOSIS — E11.9 TYPE 2 DIABETES MELLITUS WITHOUT COMPLICATION, WITHOUT LONG-TERM CURRENT USE OF INSULIN (H): Primary | ICD-10-CM

## 2018-06-20 DIAGNOSIS — R42 DIZZINESS: ICD-10-CM

## 2018-06-20 LAB — HBA1C MFR BLD: 7.2 % (ref 0–5.6)

## 2018-06-20 RX ORDER — GLIMEPIRIDE 1 MG/1
2 TABLET ORAL DAILY
Qty: 60 TABLET | Refills: 1 | Status: SHIPPED | OUTPATIENT
Start: 2018-06-20 | End: 2018-07-06

## 2018-06-20 RX ORDER — METOPROLOL TARTRATE 50 MG
50 TABLET ORAL 2 TIMES DAILY
Qty: 60 TABLET | COMMUNITY
Start: 2018-06-20 | End: 2019-08-08

## 2018-06-20 RX ORDER — GLIMEPIRIDE 1 MG/1
1 TABLET ORAL DAILY
Qty: 60 TABLET | Refills: 1 | COMMUNITY
Start: 2018-06-20 | End: 2018-06-20

## 2018-06-20 ASSESSMENT — PAIN SCALES - GENERAL: PAINLEVEL: NO PAIN (0)

## 2018-06-20 NOTE — MR AVS SNAPSHOT
After Visit Summary   6/20/2018    Shirley Lara    MRN: [id number]           Patient Information     Date Of Birth          1945        Visit Information        Provider Department      6/20/2018 2:15 PM Andrea Chapa Jennie Lynn, RAMONA [facility]        Today's Diagnoses     Type 2 diabetes mellitus without complication, without long-term current use of insulin (H)    -  1    Essential hypertension        Dizziness        Nausea        Intractable episodic tension-type headache        Benign paroxysmal positional vertigo, unspecified laterality        Personal history of fall          Care Instructions    Primary Care Center Phone Number [phone]  Primary Care Center Medication Refill Request Information:  * Please contact your pharmacy regarding ANY request for medication refills.  ** [facility] Prescription Fax = [phone]  * Please allow 3 business days for routine medication refills.  * Please allow 5 business days for controlled substance medication refills.     Primary Care Center Test Result notification information:  *You will be notified with in 7-10 days of your appointment day regarding the results of your test.  If you are on MyChart you will be notified as soon as the provider has reviewed the results and signed off on them.                    Follow-ups after your visit        Additional Services     ENDOCRINOLOGY ADULT REFERRAL       Your provider has referred you to: [facility]: Endocrinology and Diabetes Clinic - [place] [phone]   [email or web address]      Please be aware that coverage of these services is subject to the terms and limitations of your health insurance plan.  Call member services at your health plan with any benefit or coverage questions.      Please bring the following to your appointment:    >>   Any x-rays, CTs or MRIs which have been performed.  Contact the facility where  "they were done to arrange for  prior to your scheduled appointment.    >>   List of current medications   >>   This referral request   >>   Any documents/labs given to you for this referral            PHYSICAL THERAPY REFERRAL       *This therapy referral will be filtered to a centralized scheduling office at [facility] and the patient will receive a call to schedule an appointment at a [place] location most convenient for them. *     [facility] provides Physical Therapy evaluation and treatment and many specialty services across the [place] system.  If requesting a specialty program, please choose from the list below.    If you have not heard from the scheduling office within 2 business days, please call [phone] for all locations, with the exception of [place], please call [phone] and [facility], please call [phone]  Treatment: Evaluation & Treatment  Special Instructions/Modalities:   Special Programs: Balance/Vestibular    Please be aware that coverage of these services is subject to the terms and limitations of your health insurance plan.  Call member services at your health plan with any benefit or coverage questions.      **Note to Provider:  If you are referring outside of [place] for the therapy appointment, please list the name of the location in the \"special instructions\" above, print the referral and give to the patient to schedule the appointment.                  Follow-up notes from your care team     Return in about 2 weeks (around 7/4/2018).      Your next 10 appointments already scheduled     Jun 26, 2018  1:20 PM CDT   CT HEAD W/O CONTRAST with UCCT1   [facility] CT ([facility])    [address]  1st Floor  [facility] [address] [phone]           Please bring any scans or X-rays taken at other hospitals, if similar tests were done. Also bring a list of your medicines, " including vitamins, minerals and over-the-counter drugs. It is safest to leave personal items at home.  Be sure to tell your doctor:   If you have any allergies.   If there s any chance you are pregnant.   If you are breastfeeding.  You do not need to do anything special to prepare for this exam.  Please wear loose clothing, such as a sweat suit or jogging clothes. Avoid snaps, zippers and other metal. We may ask you to undress and put on a hospital gown.            Jul 06, 2018  1:00 PM CDT   (Arrive by 12:45 PM)   ACUTE/CHRONIC SINGLE CONDITION with RAMONA Cee CNP   [facility] ([facility])    [address] [place] [address]  [address] [facility] [address] [phone]            Sep 05, 2018  1:30 PM CDT   (Arrive by 1:15 PM)   NEW DIABETES with Isis Henderson MD   [facility] ([facility])    [address] [place] [address] [phone]              Future tests that were ordered for you today     Open Future Orders        Priority Expected Expires Ordered    CT Head w/o contrast Routine  6/20/2019 6/20/2018    Hemoglobin A1c Routine 6/20/2018 7/4/2018 6/20/2018            Who to contact     Please call your clinic at [phone] to:    Ask questions about your health    Make or cancel appointments    Discuss your medicines    Learn about your test results    Speak to your doctor            Additional Information About Your Visit        MyChart Information     Spectraseis is an electronic gateway that provides easy, online access to your medical records. With [employer]raseis, you can request a clinic appointment, read your test results, renew a prescription or communicate with your care team.     To sign up for Spectraseis visit the website at [email or web address]   You will be asked to enter the access code listed below, as well as some personal information. Please follow the directions to create  your username and password.     Your access code is: [email or web address]  Expires: 2018  3:40 PM     Your access code will  in 90 days. If you need help or a new code, please contact your [facility] or call [phone] for assistance.        Care EveryWhere ID     This is your Care EveryWhere ID. This could be used by other organizations to access your [place] medical records  [id number]        Your Vitals Were     Pulse Pulse Oximetry BMI (Body Mass Index)             54 100% 28.21 kg/m2          Blood Pressure from Last 3 Encounters:   18 151/74   18 152/77   18 112/67    Weight from Last 3 Encounters:   18 59.6 kg (131 lb 8 oz)   18 59.5 kg (131 lb 1.6 oz)   18 59.4 kg (131 lb)              We Performed the Following     ENDOCRINOLOGY ADULT REFERRAL     PHYSICAL THERAPY REFERRAL          Today's Medication Changes          These changes are accurate as of 18  3:53 PM.  If you have any questions, ask your nurse or doctor.               Start taking these medicines.        Dose/Directions    glimepiride 1 MG tablet   Commonly known as:  AMARYL   Used for:  Type 2 diabetes mellitus without complication, without long-term current use of insulin (H)   Started by:  Angie Bragg APRN CNP        Dose:  2 mg   Take 2 tablets (2 mg) by mouth daily   Quantity:  60 tablet   Refills:  1         These medicines have changed or have updated prescriptions.        Dose/Directions    metFORMIN 500 MG tablet   Commonly known as:  GLUCOPHAGE   This may have changed:  when to take this   Used for:  Type 2 diabetes mellitus without complication, without long-term current use of insulin (H)   Changed by:  Angie Bragg APRN CNP        Dose:  1000 mg   Take 2 tablets (1,000 mg) by mouth 2 times daily (with meals)   Quantity:  120 tablet   Refills:  3       metoprolol tartrate 50 MG tablet   Commonly known as:  LOPRESSOR   This may have  changed:  when to take this   Changed by:  Angie Bragg APRN CNP        Dose:  50 mg   Take 1 tablet (50 mg) by mouth 2 times daily   Quantity:  60 tablet   Refills:  0            Where to get your medicines      These medications were sent to [facility]/pharmacy #[id number] - [place], [place] - [address] [place] [address]     Phone:  [phone]     glimepiride 1 MG tablet    metFORMIN 500 MG tablet                Primary Care Provider Office Phone # Fax #    Kristen Mcallister [phone]       [address] [address]        Equal Access to Services     YOSELYN PARHAM : Max fryeo Soroloali, waaxda luqadaha, qaybta kaalmada adeegyada, zora pro. So [facility] [phone].    ATENCIÓN: Si habla español, tiene a stout disposición servicios gratuitos de asistencia lingüística. [facility] [phone].    We comply with applicable federal civil rights laws and [place] laws. We do not discriminate on the basis of race, color, national origin, age, disability, sex, sexual orientation, or gender identity.            Thank you!     Thank you for choosing [facility]  for your care. Our goal is always to provide you with excellent care. Hearing back from our patients is one way we can continue to improve our services. Please take a few minutes to complete the written survey that you may receive in the mail after your visit with us. Thank you!             Your Updated Medication List - Protect others around you: Learn how to safely use, store and throw away your medicines at www.disposemymeds.org.          This list is accurate as of 6/20/18  3:53 PM.  Always use your most recent med list.                   Brand Name Dispense Instructions for use Diagnosis    aspirin 81 MG EC tablet      Take 81 mg by mouth        atorvastatin 10 MG tablet    LIPITOR     Take 10 mg by  mouth        glimepiride 1 MG tablet    AMARYL    60 tablet    Take 2 tablets (2 mg) by mouth daily    Type 2 diabetes mellitus without complication, without long-term current use of insulin (H)       losartan 25 MG tablet    COZAAR     Take 25 mg by mouth        metFORMIN 500 MG tablet    GLUCOPHAGE    120 tablet    Take 2 tablets (1,000 mg) by mouth 2 times daily (with meals)    Type 2 diabetes mellitus without complication, without long-term current use of insulin (H)       metoprolol tartrate 50 MG tablet    LOPRESSOR    60 tablet    Take 1 tablet (50 mg) by mouth 2 times daily        vitamin D 32972 UNIT capsule    ERGOCALCIFEROL     Take 50,000 Units by mouth

## 2018-06-20 NOTE — NURSING NOTE
Chief Complaint   Patient presents with     Nutrition Counseling     Pt. had consult with dietian and needs to follow up with provider.       Annia Asif LPN at 2:40 PM on 6/20/2018

## 2018-06-20 NOTE — PROGRESS NOTES
[facility]  Primary Care Center   RAMONA Rothman CNP  06/20/2018      Chief Complaint:   Nutrition Counseling       History of Present Illness:   Shirley Lara is a 72 year old female with a history of type 2 diabetes, hypertension, and hyperlipidemia who presents for evaluation of a follow up.    Blood Pressure:  In a previous visit on 06/01/18 with Shirley, she reported her systolic pressure range as 130s-180s with an average in the 140s. Her diastolic pressure ranged from 62-86 with an average in the 60's. The patient was recommended to continue monitoring her levels. Shirley has experienced more episodes of dizziness and high blood pressure since her previous visit. We did not adjust her BP medications d/t these intermittent dizzy episodes.    Appetite:  Previously, Shirley noted that she had a lack of appetite which she attributes to worrying about her blood sugar levels. A hepatic panel and TSH test were ordered. Her daughter reports today that her mother is still not eating well. I requested that she meet with a nutritionist; there is some question that she is not remembering what she is eating, as she continues to say she is not eating, but there has not been any sign of weight loss.     Blood Glucose:  The patient's BG levels have been in the 250-300s range. Shirley was scheduled to see Jacqueline Romero of diabetes education on 06/18/2018.    At Shirley's recent visit to the dietitian there was no limitations as to what she can eat. She is worried about her blood sugar and continues to have a poor appetite. Shirley has brought blood sugar recordings taken at home. She continues taking Metformin and Amaryl.   Hemoglobin:  At the previous visit, Shirley's hemoglobin levels were low at 10.4. She had beeen having dizzy episodes that appear suddenly. FIT testing was negative. Shirley notes today that she occasionally experiences headaches. She vomits intermittently due to the nausea. Her dizziness is still present  but intermittent. She does notice her dizziness symptoms occur when she sits up. Shirley reports that she does have a walker but she doesn't use it since she doesn't leave home often.     Review of Systems:   Pertinent items are noted in HPI, remainder of complete ROS is negative.      Active Medications:      aspirin 81 MG EC tablet, Take 81 mg by mouth, Disp: , Rfl:      atorvastatin (LIPITOR) 10 MG tablet, Take 10 mg by mouth, Disp: , Rfl:      glimepiride (AMARYL) 1 MG tablet, Take 1 mg by mouth, Disp: , Rfl:      hydrochlorothiazide (HYDRODIURIL) 25 MG tablet, Take 12.5 mg by mouth, Disp: , Rfl:      losartan (COZAAR) 25 MG tablet, Take 25 mg by mouth, Disp: , Rfl:      metFORMIN (GLUCOPHAGE) 500 MG tablet, Take 1,000 mg by mouth, Disp: , Rfl:      metoprolol tartrate (LOPRESSOR) 50 MG tablet, Take 50 mg by mouth, Disp: , Rfl:      vitamin D (ERGOCALCIFEROL) 77005 UNIT capsule, Take 50,000 Units by mouth, Disp: , Rfl:       Allergies:   Review of patient's allergies indicates no known allergies.      Past Medical History:  Hyperlipidemia  Hypertension  T2DM     Past Surgical History:  No past surgical history.    Family History:   Family History unknown: Yes      Social History:   The patient was accompanied to the appointment by her daughter and her .  Smoking Status: Never  Smokeless Tobacco: Never Used  Alcohol Use: No  Marital Status:       Physical Exam:   /74  Pulse 54  Wt 59.6 kg (131 lb 8 oz)  SpO2 100%  BMI 28.21 kg/m2   Constitutional: Alert, oriented, pleasant, no acute distress  Head: Normocephalic, atraumatic  Eyes: Extra-ocular movements intact, pupils equally round and reactive bilaterally, no scleral icterus  ENT: Oropharynx clear, moist mucus membranes, wears upper dentures  Cardiovascular: Regular rate and rhythm, no murmurs, rubs or gallops, peripheral pulses full/symmetric  Respiratory: Good air movement bilaterally, lungs clear, no wheezes/rales/rhonchi  GI:  Abdomen soft, bowel sounds present, nondistended, nontender, no organomegaly or masses, no rebound/guarding  Musculoskeletal: No edema, normal muscle tone, normal gait  Neurologic: Alert and oriented, cranial nerves 2-12 intact, strength 5/5 throughout, sensation to light touch intact  Foot Exam:  normal DP and PT pulses, no trophic changes or ulcerative lesions, normal sensory exam and normal monofilament exam  Skin: No rashes/lesions  Psychiatric: normal mentation, affect and mood      Assessment and Plan:  Type 2 diabetes mellitus without complication, without long-term current use of insulin (H)  After reviewing Shirley's diabetes management, I have urged her to eat regular meals to help reduce her stomach aches. Will recheck A1C today and have her f/u with endocrinology. Will increase her Amaryl back to 2 mg given persistent elevated BGs 250s-300s. Reviewed foot care and importance of wearing shoes.  - Hemoglobin A1c  - ENDOCRINOLOGY ADULT REFERRAL  - metFORMIN (GLUCOPHAGE) 500 MG tablet  Dispense: 120 tablet; Refill: 3  - glimepiride (AMARYL) 1 MG tablet  Dispense: 60 tablet; Refill: 1    Essential hypertension  I would like Shirley to continue taking her current blood pressure medications and monitor her blood pressure.    Dizziness and Nausea, Intractable episodic tension-type headache  Shirley has been experiencing reoccurring dizzy spells which I don't believe is attributed to her glimepiride medication (her dose was recently decreased d/t potential side effects of dizziness, but there was no improvement in her symptoms with this). However, I am concerned these episodes have increased in frequency, now with intermittent headaches and nausea. I would like a CT scan of Shirley's head to further investigate these symptoms.  - CT Head w/o contrast    Benign paroxysmal positional vertigo, unspecified laterality and Personal history of fall  After reviewing the safety concerns I have due to Shirley's history of falls, I  have referred her to physical therapy for balance exercises and fall prevention.   - PHYSICAL THERAPY REFERRAL     Follow-up: Return in about 2 weeks (around 7/4/2018).         Scribe Disclosure:   I, Jose Alfredo Renee, am serving as a scribe to document services personally performed by RAMONA Rothman CNP at this visit, based upon the provider's statements to me. All documentation has been reviewed by the aforementioned provider prior to being entered into the official medical record.     Portions of this medical record were completed by a scribe. UPON MY REVIEW AND AUTHENTICATION BY ELECTRONIC SIGNATURE, this confirms (a) I performed the applicable clinical services, and (b) the record is accurate.     RAMONA Rothman CNP

## 2018-06-20 NOTE — PATIENT INSTRUCTIONS
Primary Care Center Phone Number [phone]  Primary Care Center Medication Refill Request Information:  * Please contact your pharmacy regarding ANY request for medication refills.  ** [facility] Prescription Fax = [phone]  * Please allow 3 business days for routine medication refills.  * Please allow 5 business days for controlled substance medication refills.     Primary Care Center Test Result notification information:  *You will be notified with in 7-10 days of your appointment day regarding the results of your test.  If you are on MyChart you will be notified as soon as the provider has reviewed the results and signed off on them.

## 2018-06-26 ENCOUNTER — RADIANT APPOINTMENT (OUTPATIENT)
Dept: CT IMAGING | Facility: CLINIC | Age: 73
End: 2018-06-26
Attending: NURSE PRACTITIONER
Payer: COMMERCIAL

## 2018-06-26 DIAGNOSIS — R42 DIZZINESS: ICD-10-CM

## 2018-06-26 DIAGNOSIS — R11.0 NAUSEA: ICD-10-CM

## 2018-06-28 ENCOUNTER — TELEPHONE (OUTPATIENT)
Dept: INTERNAL MEDICINE | Facility: CLINIC | Age: 73
End: 2018-06-28

## 2018-06-28 NOTE — TELEPHONE ENCOUNTER
I spoke with Shirley today via an . Reviewed A1C results. Advised she continue to monitor blood sugar, take increased dose of glimepiride (2 mg daily) as discussed in clinic visit, and continue to work on diet and exercise. She voiced understanding. Confirmed follow up appointment date and time with patient.    Evonne Fuller RN

## 2018-07-06 ENCOUNTER — OFFICE VISIT (OUTPATIENT)
Dept: INTERNAL MEDICINE | Facility: CLINIC | Age: 73
End: 2018-07-06
Payer: COMMERCIAL

## 2018-07-06 VITALS
DIASTOLIC BLOOD PRESSURE: 69 MMHG | BODY MASS INDEX: 28.34 KG/M2 | WEIGHT: 132.1 LBS | HEART RATE: 53 BPM | SYSTOLIC BLOOD PRESSURE: 138 MMHG

## 2018-07-06 DIAGNOSIS — K12.0 APHTHOUS ULCER OF MOUTH: ICD-10-CM

## 2018-07-06 DIAGNOSIS — J30.2 SEASONAL ALLERGIC RHINITIS, UNSPECIFIED CHRONICITY, UNSPECIFIED TRIGGER: Primary | ICD-10-CM

## 2018-07-06 DIAGNOSIS — M25.512 LEFT SHOULDER PAIN, UNSPECIFIED CHRONICITY: ICD-10-CM

## 2018-07-06 DIAGNOSIS — R09.81 NASAL CONGESTION: ICD-10-CM

## 2018-07-06 DIAGNOSIS — E11.9 TYPE 2 DIABETES MELLITUS WITHOUT COMPLICATION, WITHOUT LONG-TERM CURRENT USE OF INSULIN (H): ICD-10-CM

## 2018-07-06 RX ORDER — FLUTICASONE PROPIONATE 50 MCG
1 SPRAY, SUSPENSION (ML) NASAL DAILY
Qty: 1 BOTTLE | Refills: 3 | Status: SHIPPED | OUTPATIENT
Start: 2018-07-06 | End: 2019-02-27

## 2018-07-06 RX ORDER — TRIAMCINOLONE ACETONIDE 1 MG/G
OINTMENT TOPICAL
COMMUNITY
Start: 2016-08-19

## 2018-07-06 RX ORDER — GLIMEPIRIDE 1 MG/1
1 TABLET ORAL DAILY
Qty: 60 TABLET | Refills: 1 | COMMUNITY
Start: 2018-07-06 | End: 2019-08-08

## 2018-07-06 RX ORDER — DIPHENOXYLATE HYDROCHLORIDE AND ATROPINE SULFATE 2.5; .025 MG/1; MG/1
TABLET ORAL
COMMUNITY
Start: 2016-08-15

## 2018-07-06 RX ORDER — BLOOD-GLUCOSE METER
KIT MISCELLANEOUS
Refills: 0 | COMMUNITY
Start: 2018-07-03

## 2018-07-06 RX ORDER — POLYETHYLENE GLYCOL 3350 17 G/17G
POWDER, FOR SOLUTION ORAL
COMMUNITY
Start: 2016-07-13

## 2018-07-06 RX ORDER — TIZANIDINE 2 MG/1
2-4 TABLET ORAL
COMMUNITY
Start: 2016-09-28

## 2018-07-06 RX ORDER — FLUTICASONE PROPIONATE 50 MCG
2 SPRAY, SUSPENSION (ML) NASAL
COMMUNITY
Start: 2016-08-15 | End: 2018-07-06

## 2018-07-06 ASSESSMENT — PAIN SCALES - GENERAL: PAINLEVEL: MODERATE PAIN (5)

## 2018-07-06 NOTE — MR AVS SNAPSHOT
After Visit Summary   7/6/2018    Shirley Lara    MRN: [id number]           Patient Information     Date Of Birth          1945        Visit Information        Provider Department      7/6/2018 12:45 PM Angie Bragg APRN CNP; [facility] [employer] [facility]        Today's Diagnoses     Seasonal allergic rhinitis, unspecified chronicity, unspecified trigger    -  1    Type 2 diabetes mellitus without complication, without long-term current use of insulin (H)        Nasal congestion        Left shoulder pain, unspecified chronicity        Aphthous ulcer of mouth          Care Instructions    Primary Care Center: [phone]     Primary Care Center Medication Refill Request Information:  * Please contact your pharmacy regarding ANY request for medication refills.  ** [facility] Prescription Fax = [phone]  * Please allow 3 business days for routine medication refills.  * Please allow 5 business days for controlled substance medication refills.     Primary Care Center Test Result notification information:  *You will be notified with in 7-10 days of your appointment day regarding the results of your test.  If you are on MyChart you will be notified as soon as the provider has reviewed the results and signed off on them.      At your visit today, we discussed your risk for falls and preventive options.    Fall Prevention  Falls often occur due to slipping, tripping or losing your balance. Millions of people fall every year and injure themselves. Here are ways to reduce your risk of falling again.    Think about your fall, was there anything that caused your fall that can be fixed, removed, or replaced?    Make your home safe by keeping walkways clear of objects you may trip over.    Use non-slip pads under rugs. Do not use area rugs or small throw rugs.    Use non-slip mats in bathtubs and showers.    Install handrails and lights on staircases.    Do not walk in poorly  lit areas.    Do not stand on chairs or wobbly ladders.    Use caution when reaching overhead or looking upward. This position can cause a loss of balance.    Be sure your shoes fit properly, have non-slip bottoms and are in good condition.     Wear shoes both inside and out. Avoid going barefoot or wearing slippers.    Be cautious when going up and down stairs, curbs, and when walking on uneven sidewalks.    If your balance is poor, consider using a cane or walker.    If your fall was related to alcohol use, stop or limit alcohol intake.     If your fall was related to use of sleeping medicines, talk to your doctor about this. You may need to reduce your dosage at bedtime if you awaken during the night to go to the bathroom.      To reduce the need for nighttime bathroom trips:  ? Avoid drinking fluids for several hours before going to bed  ? Empty your bladder before going to bed  ? Men can keep a urinal at the bedside    Stay as active as you can. Balance, flexibility, strength, and endurance all come from exercise. They all play a role in preventing falls. Ask your healthcare provider which types of activity are right for you.    Get your vision checked on a regular basis.    If you have pets, know where they are before you stand up or walk so you don't trip over them.    Use night lights.  Date Last Reviewed: 11/5/2015 2000-2017 [employer]. [address] [place] [address]. All rights reserved. This information is not intended as a substitute for professional medical care. Always follow your healthcare professional's instructions.                Follow-ups after your visit        Your next 10 appointments already scheduled     Sep 05, 2018  1:30 PM CDT   (Arrive by 1:15 PM)   NEW DIABETES with Isis Henderson MD   [facility] ([facility])    [address] [address] [phone]              Who to contact      Please call your clinic at [phone] to:    Ask questions about your health    Make or cancel appointments    Discuss your medicines    Learn about your test results    Speak to your doctor            Additional Information About Your Visit        Care EveryWhere ID     This is your Care EveryWhere ID. This could be used by other organizations to access your [place] medical records  [id number]        Your Vitals Were     Pulse BMI (Body Mass Index)                53 28.34 kg/m2           Blood Pressure from Last 3 Encounters:   07/06/18 138/69   06/20/18 151/74   06/01/18 152/77    Weight from Last 3 Encounters:   07/06/18 59.9 kg (132 lb 1.6 oz)   06/20/18 59.6 kg (131 lb 8 oz)   06/18/18 59.5 kg (131 lb 1.6 oz)              Today, you had the following     No orders found for display         Today's Medication Changes          These changes are accurate as of 7/6/18  1:41 PM.  If you have any questions, ask your nurse or doctor.               Start taking these medicines.        Dose/Directions    camphor-menthol-methyl sal 4-10-30 % Crea   Commonly known as:  SARITHA JANG ULTRA STRENGTH   Used for:  Left shoulder pain, unspecified chronicity   Started by:  Angie Bragg APRN CNP        Externally apply topically 4 times daily as needed   Quantity:  113 g   Refills:  3         These medicines have changed or have updated prescriptions.        Dose/Directions    fluticasone 50 MCG/ACT spray   Commonly known as:  FLONASE   This may have changed:    - how much to take  - how to take this  - when to take this   Used for:  Seasonal allergic rhinitis, unspecified chronicity, unspecified trigger, Nasal congestion   Changed by:  Angie Bragg APRN CNP        Dose:  1 spray   Spray 1 spray into both nostrils daily   Quantity:  1 Bottle   Refills:  3       glimepiride 1 MG tablet   Commonly known as:  AMARYL   This may have changed:  how much to take   Used for:  Type 2 diabetes mellitus without  complication, without long-term current use of insulin (H)   Changed by:  Angie Bragg APRN CNP        Dose:  1 mg   Take 1 tablet (1 mg) by mouth daily   Quantity:  60 tablet   Refills:  1       lidocaine (viscous) 2 % solution   Commonly known as:  XYLOCAINE   This may have changed:    - how much to take  - how to take this  - when to take this  - reasons to take this   Used for:  Aphthous ulcer of mouth   Changed by:  Angie Bragg APRN CNP        Dose:  15 mL   Take 15 mLs by mouth every 6 hours as needed for moderate pain   Quantity:  100 mL   Refills:  3            Where to get your medicines      These medications were sent to [facility]/pharmacy #[id number] - [place], [place] - [address] [employer] [place] [address]     Phone:  [phone]     camphor-menthol-methyl sal 4-10-30 % Crea    fluticasone 50 MCG/ACT spray    lidocaine (viscous) 2 % solution                Primary Care Provider Office Phone # Fax #    Kristen MD Esvin [phone]       [address] [address]        Equal Access to Services     YOSELYN PARHAM AH: Hadaby fryeo Soroloali, waaxda luqadaha, qaybta kaalmada adeegyada, zora pro. So [facility] [phone].    ATENCIÓN: Si habla español, tiene a stout disposición servicios gratuitos de asistencia lingüística. Luis al [phone].    We comply with applicable federal civil rights laws and [place] laws. We do not discriminate on the basis of race, color, national origin, age, disability, sex, sexual orientation, or gender identity.            Thank you!     Thank you for choosing [facility]  for your care. Our goal is always to provide you with excellent care. Hearing back from our patients is one way we can continue to improve our services. Please take a few minutes to complete the written survey that you may receive in the mail after  your visit with us. Thank you!             Your Updated Medication List - Protect others around you: Learn how to safely use, store and throw away your medicines at www.disposemymeds.org.          This list is accurate as of 7/6/18  1:41 PM.  Always use your most recent med list.                   Brand Name Dispense Instructions for use Diagnosis    aspirin 81 MG EC tablet      Take 81 mg by mouth        atorvastatin 10 MG tablet    LIPITOR     Take 10 mg by mouth        blood glucose monitoring meter device kit      DISPENSE METER, TEST STRIPS, LANCETS COVERED BY PT INS. E11.9 NIDDM TYPE II - TEST 1 TIME/DAY        blood glucose monitoring test strip    no brand specified     Dispense item covered by pt ins. E11.65 NIDDM type II, uncontrolled - Test 2 times/day. Reason: High A1C        camphor-menthol-methyl sal 4-10-30 % Crea    SARITHA JANG ULTRA STRENGTH    113 g    Externally apply topically 4 times daily as needed    Left shoulder pain, unspecified chronicity       fluticasone 50 MCG/ACT spray    FLONASE    1 Bottle    Spray 1 spray into both nostrils daily    Seasonal allergic rhinitis, unspecified chronicity, unspecified trigger, Nasal congestion       glimepiride 1 MG tablet    AMARYL    60 tablet    Take 1 tablet (1 mg) by mouth daily    Type 2 diabetes mellitus without complication, without long-term current use of insulin (H)       lidocaine (viscous) 2 % solution    XYLOCAINE    100 mL    Take 15 mLs by mouth every 6 hours as needed for moderate pain    Aphthous ulcer of mouth       losartan 25 MG tablet    COZAAR     Take 25 mg by mouth        metFORMIN 500 MG tablet    GLUCOPHAGE    120 tablet    Take 2 tablets (1,000 mg) by mouth 2 times daily (with meals)    Type 2 diabetes mellitus without complication, without long-term current use of insulin (H)       metoprolol tartrate 50 MG tablet    LOPRESSOR    60 tablet    Take 1 tablet (50 mg) by mouth 2 times daily        MULTI-VITAMINS Tabs            polyethylene glycol Packet    MIRALAX/GLYCOLAX     TAKE 17 G BY MOUTH TWICE A DAY FOR 7 DAYS.        tiZANidine 2 MG tablet    ZANAFLEX     Take 2-4 mg by mouth        triamcinolone 0.1 % ointment    KENALOG     APPLY SMALL AMOUNT TO ITCHY AREAS ON ARMS & HANDS 2X/DAY FOR 2WKS MAX, THEN OFF, THEN AS NEEDED        vitamin D 95879 UNIT capsule    ERGOCALCIFEROL     Take 50,000 Units by mouth

## 2018-07-06 NOTE — PATIENT INSTRUCTIONS
[facility]: [phone]     [facility] Medication Refill Request Information:  * Please contact your pharmacy regarding ANY request for medication refills.  ** [facility] Prescription Fax = [phone]  * Please allow 3 business days for routine medication refills.  * Please allow 5 business days for controlled substance medication refills.     Primary Care Center Test Result notification information:  *You will be notified with in 7-10 days of your appointment day regarding the results of your test.  If you are on MyChart you will be notified as soon as the provider has reviewed the results and signed off on them.      At your visit today, we discussed your risk for falls and preventive options.    Fall Prevention  Falls often occur due to slipping, tripping or losing your balance. Millions of people fall every year and injure themselves. Here are ways to reduce your risk of falling again.    Think about your fall, was there anything that caused your fall that can be fixed, removed, or replaced?    Make your home safe by keeping walkways clear of objects you may trip over.    Use non-slip pads under rugs. Do not use area rugs or small throw rugs.    Use non-slip mats in bathtubs and showers.    Install handrails and lights on staircases.    Do not walk in poorly lit areas.    Do not stand on chairs or wobbly ladders.    Use caution when reaching overhead or looking upward. This position can cause a loss of balance.    Be sure your shoes fit properly, have non-slip bottoms and are in good condition.     Wear shoes both inside and out. Avoid going barefoot or wearing slippers.    Be cautious when going up and down stairs, curbs, and when walking on uneven sidewalks.    If your balance is poor, consider using a cane or walker.    If your fall was related to alcohol use, stop or limit alcohol intake.     If your fall was related to use of sleeping medicines, talk to your doctor about this. You may need to  reduce your dosage at bedtime if you awaken during the night to go to the bathroom.      To reduce the need for nighttime bathroom trips:  ? Avoid drinking fluids for several hours before going to bed  ? Empty your bladder before going to bed  ? Men can keep a urinal at the bedside    Stay as active as you can. Balance, flexibility, strength, and endurance all come from exercise. They all play a role in preventing falls. Ask your healthcare provider which types of activity are right for you.    Get your vision checked on a regular basis.    If you have pets, know where they are before you stand up or walk so you don't trip over them.    Use night lights.  Date Last Reviewed: 11/5/2015 2000-2017 [employer]. [address] [place] [address], [place] [address]. All rights reserved. This information is not intended as a substitute for professional medical care. Always follow your healthcare professional's instructions.

## 2018-07-06 NOTE — PROGRESS NOTES
[facility]  Primary Care Center   RAMONA Rothman CNP  07/06/2018      Chief Complaint:   Diabetes       History of Present Illness: The patient's clinic visit was completed with the assistance of an Bengali .  Shirley Lara is a 73 year old female with a history of type 2 diabetes mellitus, hyperlipidemia, and hypertension, who presents with her daughter for evaluation of a diabetes follow up. Overall, the patient denies continued nausea. She reports slight improvement of her loss of appetite.     Diabetes: The patient was last evaluated in clinic on 06/20/2018 with her glimepiride prescription increased to 2 mg daily due to persistent elevated BG levels. Her HbA1c on 06/20/2018 was found to be 7.2. The patient was then evaluated by Dr. Dillard of [facility] on 07/03/2018 with her glimepiride decreased back down to 1 mg and a continuation of her normal Metformin. The patient has been her blood glucose levels at home with these returning in the 112-272 range with averages in the 140s and 150s.     Blood pressure: She has been monitoring her blood pressures at home with these levels in the 130-180 over 60-79 range, showing slight improvement. Her average blood pressure levels are in the 130-140s [id number] range.     Dizziness: During the patient's appointment to clinic on 06/20, she was experiencing dizziness. CT head was ordered for further evaluation of her symptoms, impression as below:      Impression:    1. No acute intracranial pathology.   2. Questionable punctate old lacunar infarct of the right thalamus.   MRI more sensitive for evaluation for chronic and acute infarct.    Other:  1. Medication refills needed     A. Saud Holman- for left shoulder and knee pain management    B. Viscous Lidocaine- for painful pimple-like lesions within the mouth    C. Flonase     Review of Systems:   Pertinent items are noted in HPI, remainder of complete ROS is negative.      Active  Medications:      aspirin 81 MG EC tablet, Take 81 mg by mouth, Disp: , Rfl:      atorvastatin (LIPITOR) 10 MG tablet, Take 10 mg by mouth, Disp: , Rfl:      glimepiride (AMARYL) 1 MG tablet, Take 2 tablets (2 mg) by mouth daily, Disp: 60 tablet, Rfl: 1     losartan (COZAAR) 25 MG tablet, Take 25 mg by mouth, Disp: , Rfl:      metFORMIN (GLUCOPHAGE) 500 MG tablet, Take 2 tablets (1,000 mg) by mouth 2 times daily (with meals), Disp: 120 tablet, Rfl: 3     metoprolol tartrate (LOPRESSOR) 50 MG tablet, Take 1 tablet (50 mg) by mouth 2 times daily, Disp: 60 tablet, Rfl:      vitamin D (ERGOCALCIFEROL) 56803 UNIT capsule, Take 50,000 Units by mouth, Disp: , Rfl:       Allergies:   Review of patient's allergies indicates no known allergies.      Past Medical History:  Hyperlipidemia LDL goal <100  Essential hypertension   Type 2 diabetes mellitus without complications      Past Surgical History:  The patient does not have any pertinent past surgical history.     Family History:   The patient's family history is unknown.       Social History:   The patient is  and has a daughter.     Physical Exam:   /69  Pulse 53  Wt 59.9 kg (132 lb 1.6 oz)  BMI 28.34 kg/m2      Constitutional: no distress, comfortable, pleasant, well-groomed  Eyes: anicteric, conjunctiva pink, normal extra-ocular movements   Ears, Nose and Throat: throat clear, mucosa pink and moist. Upper dentures. No active oral ulcers.   Cardiovascular: regular rate and rhythm, normal S1 and S2, no murmurs, rubs or gallops  Respiratory: clear to auscultation with good air movement bilaterally, no wheezes or crackles, non-labored  Psychological: appropriate mood, demonstrates intact judgment and logical thought process     Assessment and Plan:  1. Type 2 diabetes mellitus without complication, without long-term current use of insulin (H)  Patient's was evaluated by Dr. Dillard of [facility] on 07/03 with glimepiride dosage decreased to 1 mg daily.  Prescription refill provided today as patient's BG levels have been within a normal range. Discussed consolidating care to avoid confusion.  - glimepiride (AMARYL) 1 MG tablet  Dispense: 60 tablet; Refill: 1    2. Nasal congestion and seasonal allergic rhinitis, unspecified chronicity, unspecified trigger  - fluticasone (FLONASE) 50 MCG/ACT spray; Spray 1 spray into both nostrils daily  Dispense: 1 Bottle; Refill: 3    3. Left shoulder pain, unspecified chronicity  - camphor-menthol-methyl sal (SARITHA JANG ULTRA STRENGTH) 4-10-30 % CREA; Externally apply topically 4 times daily as needed  Dispense: 113 g; Refill: 3    4. Aphthous ulcer of mouth  Patient occasionally develops oral ulcers. Recommended avoiding acidic or spicy foods, which can trigger symptoms. Refill for viscous lidocaine for rinse and spit if needed.  - lidocaine, viscous, (XYLOCAINE) 2 % solution  Dispense: 100 mL; Refill: 3      Follow-up: Patient should follow up in about 3 months, or sooner as needed. She may prefer to continue to see Dr. Dillard with [facility] instead given closer proximity to home.      Scribe Disclosure:  I, Maryjo Clayton, am serving as a scribe to document services personally performed by RAMONA Rothman CNP at this visit, based upon the provider's statements to me. All documentation has been reviewed by the aforementioned provider prior to being entered into the official medical record.     Portions of this medical record were completed by a scribe. UPON MY REVIEW AND AUTHENTICATION BY ELECTRONIC SIGNATURE, this confirms (a) I performed the applicable clinical services, and (b) the record is accurate.     RAMONA Rothman CNP

## 2018-07-06 NOTE — NURSING NOTE
Chief Complaint   Patient presents with     Diabetes     pt here to discuss diabetes       Katelynn Olson CMA at 1:12 PM on 7/6/2018.

## 2019-02-27 DIAGNOSIS — R09.81 NASAL CONGESTION: ICD-10-CM

## 2019-02-27 DIAGNOSIS — J30.2 SEASONAL ALLERGIC RHINITIS, UNSPECIFIED TRIGGER: ICD-10-CM

## 2019-02-28 RX ORDER — FLUTICASONE PROPIONATE 50 MCG
1 SPRAY, SUSPENSION (ML) NASAL DAILY
Qty: 18 ML | Refills: 2 | Status: SHIPPED | OUTPATIENT
Start: 2019-02-28 | End: 2019-07-29

## 2019-07-29 DIAGNOSIS — R09.81 NASAL CONGESTION: ICD-10-CM

## 2019-07-29 DIAGNOSIS — J30.2 SEASONAL ALLERGIC RHINITIS, UNSPECIFIED TRIGGER: ICD-10-CM

## 2019-07-30 RX ORDER — FLUTICASONE PROPIONATE 50 MCG
1 SPRAY, SUSPENSION (ML) NASAL DAILY
Qty: 16 ML | Refills: 0 | Status: SHIPPED | OUTPATIENT
Start: 2019-07-30 | End: 2019-07-31

## 2019-07-30 NOTE — TELEPHONE ENCOUNTER
fluticasone (FLONASE) 50 MCG/ACT nasal spray      Last Written Prescription Date:  2/28/19  Last Fill Quantity: 18ml,   # refills: 2  Last Office Visit : 7/6/18  Future Office visit:  none    30 day to pharmacy. Patient instructed to call clinic to schedule an appointment.     Routing because:  Appointment needed.

## 2019-07-31 ENCOUNTER — DOCUMENTATION ONLY (OUTPATIENT)
Dept: CARE COORDINATION | Facility: CLINIC | Age: 74
End: 2019-07-31

## 2019-07-31 DIAGNOSIS — J30.2 SEASONAL ALLERGIC RHINITIS, UNSPECIFIED TRIGGER: ICD-10-CM

## 2019-07-31 DIAGNOSIS — R09.81 NASAL CONGESTION: ICD-10-CM

## 2019-07-31 RX ORDER — FLUTICASONE PROPIONATE 50 MCG
1 SPRAY, SUSPENSION (ML) NASAL DAILY
Qty: 16 ML | Refills: 0 | Status: SHIPPED | OUTPATIENT
Start: 2019-07-31 | End: 2019-09-18

## 2019-08-08 ENCOUNTER — OFFICE VISIT (OUTPATIENT)
Dept: INTERNAL MEDICINE | Facility: CLINIC | Age: 74
End: 2019-08-08
Payer: COMMERCIAL

## 2019-08-08 VITALS
BODY MASS INDEX: 27.03 KG/M2 | SYSTOLIC BLOOD PRESSURE: 148 MMHG | WEIGHT: 126 LBS | DIASTOLIC BLOOD PRESSURE: 74 MMHG | HEART RATE: 57 BPM

## 2019-08-08 DIAGNOSIS — I10 ESSENTIAL HYPERTENSION: ICD-10-CM

## 2019-08-08 DIAGNOSIS — E11.9 TYPE 2 DIABETES MELLITUS WITHOUT COMPLICATION, WITHOUT LONG-TERM CURRENT USE OF INSULIN (H): ICD-10-CM

## 2019-08-08 DIAGNOSIS — M19.90 OSTEOARTHRITIS, UNSPECIFIED OSTEOARTHRITIS TYPE, UNSPECIFIED SITE: Primary | ICD-10-CM

## 2019-08-08 LAB
ANION GAP SERPL CALCULATED.3IONS-SCNC: 7 MMOL/L (ref 3–14)
BUN SERPL-MCNC: 16 MG/DL (ref 7–30)
CALCIUM SERPL-MCNC: 9.3 MG/DL (ref 8.5–10.1)
CHLORIDE SERPL-SCNC: 102 MMOL/L (ref 94–109)
CO2 SERPL-SCNC: 28 MMOL/L (ref 20–32)
CREAT SERPL-MCNC: 0.89 MG/DL (ref 0.52–1.04)
CREAT UR-MCNC: 68 MG/DL
GFR SERPL CREATININE-BSD FRML MDRD: 64 ML/MIN/{1.73_M2}
GLUCOSE SERPL-MCNC: 193 MG/DL (ref 70–99)
POTASSIUM SERPL-SCNC: 3.4 MMOL/L (ref 3.4–5.3)
PROT UR-MCNC: 0.11 G/L
PROT/CREAT 24H UR: 0.16 G/G CR (ref 0–0.2)
SODIUM SERPL-SCNC: 137 MMOL/L (ref 133–144)

## 2019-08-08 RX ORDER — ACETAMINOPHEN 500 MG
500 TABLET ORAL EVERY 6 HOURS PRN
Qty: 30 TABLET | Refills: 0 | Status: SHIPPED | OUTPATIENT
Start: 2019-08-08 | End: 2019-12-12

## 2019-08-08 RX ORDER — ATORVASTATIN CALCIUM 10 MG/1
10 TABLET, FILM COATED ORAL DAILY
Qty: 30 TABLET | Refills: 3 | Status: SHIPPED | OUTPATIENT
Start: 2019-08-08 | End: 2021-04-21

## 2019-08-08 RX ORDER — LOSARTAN POTASSIUM 25 MG/1
50 TABLET ORAL DAILY
Qty: 30 TABLET | Refills: 3 | Status: SHIPPED | OUTPATIENT
Start: 2019-08-08 | End: 2019-10-03

## 2019-08-08 RX ORDER — METOPROLOL TARTRATE 50 MG
50 TABLET ORAL 2 TIMES DAILY
Qty: 60 TABLET | Refills: 3 | Status: SHIPPED | OUTPATIENT
Start: 2019-08-08 | End: 2020-03-05

## 2019-08-08 RX ORDER — GLIMEPIRIDE 1 MG/1
1 TABLET ORAL DAILY
Qty: 60 TABLET | Refills: 3 | Status: SHIPPED | OUTPATIENT
Start: 2019-08-08 | End: 2021-04-16

## 2019-08-08 ASSESSMENT — PAIN SCALES - GENERAL: PAINLEVEL: SEVERE PAIN (6)

## 2019-08-08 NOTE — PROGRESS NOTES
PRIMARY CARE CENTER       ASSESSMENT/PLAN:  Shirley Lara is a 74 year old female with a PMHx of HTN, HLD and T2DM who presents for medication check in.     Osteoarthritis, unspecified osteoarthritis type, unspecified site  -     acetaminophen (TYLENOL) 500 MG tablet; Take 1 tablet (500 mg) by mouth every 6 hours as needed for mild pain  - 3 Gm limit.     Type 2 diabetes mellitus without complication, without long-term current use of insulin (H)  Hemoglobin A1c is well controlled at 7.0 on most recent labs as above.  We will continue current regimen.  No neuropathy, or nephropathy.  Obtained urine p:c ratio which is appropriate today.  Encourage annual eye examination.  -     Basic metabolic panel; Future  -     Protein  random urine with Creat Ratio; Future  -     metFORMIN (GLUCOPHAGE) 1000 MG tablet; Take 1 tablet (1,000 mg) by mouth 2 times daily (with meals)  -     losartan (COZAAR) 25 MG tablet; Take 2 tablets (50 mg) by mouth daily  -     glimepiride (AMARYL) 1 MG tablet; Take 1 tablet (1 mg) by mouth daily    CAD Risk factor modification   -     atorvastatin (LIPITOR) 10 MG tablet; Take 1 tablet (10 mg) by mouth daily ideally would be on higher dose. Can consider increase at next visit.   - Refill metoprolol Tartrate     Essential hypertension  -     losartan (COZAAR) 25 MG tablet; Take 2 tablets (50 mg) by mouth daily - dose INCREASED   - BMP in 2 weeks time.      Pt should return to clinic for f/u with me in 2 weeks     Kristen Mcallister MD  Aug 8, 2019    Pt was discussed with .     While the patient was in clinic, I reviewed the pertinent medical history and results.  I discussed the current findings on physical examination, as well as the patient s diagnosis and treatment plan with the resident and agree with the information as documented with the following exceptions: none.  Sophie Jones,  MD  -----------------------------------------------------------------------------------------------------------------------  SUBJECTIVE:  Shirley Lara is a 74 year old female with a PMHx of HTN, HLD and T2DM who presents for medication check in.     She has a medical history of HLD and HTN as well as DM that is managed with oral agents. she tells me that she has no significant concerns.  Generally, she has been doing quite well.  Is adherent to her medications however does require a number of refills.      With respect to her diabetes, states that she has no hypoglycemic episodes.  Checks her blood sugar about once per day and none are under 100.  With respect to her hypertension, checks her blood pressure approximately every other day, and these range between 1 40-1 60 systolic.    Does take walks with her family.  Daughter present at bedside and states that she has trouble eating enough oral intake per day.  She is concerned about this.  The mother however seems grossly unconcerned.     Medications and allergies reviewed by me today.     ROS:   Constitutional, HEENT, cardiovascular, pulmonary, gi and gu systems are negative, except as otherwise noted.    OBJECTIVE:    BP (!) 148/74   Pulse 57   Wt 57.2 kg (126 lb)   BMI 27.03 kg/m     Wt Readings from Last 1 Encounters:   08/08/19 57.2 kg (126 lb)       GENERAL APPEARANCE: healthy, alert and no distress. Wearing hijab and this was not removed thus neck and head were not examined.      RESP: lungs clear to auscultation - no rales, rhonchi or wheezes     CV: regular rates and rhythm, normal S1 S2, no S3 or S4 and no murmur, click or rub     ABDOMEN:  soft, nontender, no HSM or masses and bowel sounds normal     MS: extremities normal- no gross deformities noted, no evidence of inflammation on feet or knees. UE not examined      PSYCH: mentation appears normal. and affect normal/bright     Labs reviewed in chart. OSH records showing most recent labs of  hemoglobin A1c of 7.0 on 7/31/2019.  Most recent CBC on 3/14/2019 showing hemoglobin of 10.4 with normal MCV at 85.  Lipid levels done on 3/19/2019 as well. Showing triglycerides of 383, HDL of 34, total cholesterol at 169.  Unclear if this was fasting however triglycerides would be impacted by such.  Most recent BMP showing a glucose of 164, creatinine 0.99 on 3/14/2019.

## 2019-08-08 NOTE — NURSING NOTE
Chief Complaint   Patient presents with     Medication Request       Nayely Ventura LPN at 1:55 PM on August 8, 2019

## 2019-08-08 NOTE — PATIENT INSTRUCTIONS
- We will check your urine for protein today   - We will increase your LOSARTAN to 50mg daily   Please measre your BP every other day at the same time daily.   - We will see you back in 2-3 weeks at which point we will recheck your kidney function and potassium.   - You can take up to 3000mg of acetaminophen per day. Use only as needed for your arthritis.     All of your prescriptions are at the [facility]   [address] [facility]

## 2019-08-22 ENCOUNTER — OFFICE VISIT (OUTPATIENT)
Dept: INTERNAL MEDICINE | Facility: CLINIC | Age: 74
End: 2019-08-22
Payer: COMMERCIAL

## 2019-08-22 VITALS — DIASTOLIC BLOOD PRESSURE: 78 MMHG | SYSTOLIC BLOOD PRESSURE: 145 MMHG | HEART RATE: 53 BPM | OXYGEN SATURATION: 99 %

## 2019-08-22 DIAGNOSIS — I10 BENIGN ESSENTIAL HYPERTENSION: ICD-10-CM

## 2019-08-22 DIAGNOSIS — F32.89 OTHER DEPRESSION: Primary | ICD-10-CM

## 2019-08-22 RX ORDER — CITALOPRAM HYDROBROMIDE 10 MG/1
10 TABLET ORAL EVERY MORNING
Qty: 90 TABLET | Refills: 0 | Status: SHIPPED | OUTPATIENT
Start: 2019-08-22 | End: 2019-11-19

## 2019-08-22 ASSESSMENT — PAIN SCALES - GENERAL: PAINLEVEL: MODERATE PAIN (5)

## 2019-08-22 ASSESSMENT — PATIENT HEALTH QUESTIONNAIRE - PHQ9
SUM OF ALL RESPONSES TO PHQ QUESTIONS 1-9: 18
SUM OF ALL RESPONSES TO PHQ QUESTIONS 1-9: 18

## 2019-08-22 NOTE — PROGRESS NOTES
PRIMARY CARE CENTER       ASSESSMENT/PLAN:  Shirley Lara is a 74 year old female with a PMHx of HTN, HLD and T2DM who presents for HTN follow up.    Depression  PHQ significantly elevated at 19 in the setting of immigration, family strife.   COunseled for a prolonged period of time that depression can be mild in elderly individuals and without marked symptoms however an extremely low dose antidepressant can help. Also discussed counseling resources however patient and daughter not amenable at this time. I discussed wehther they feel like they have support in their community and her daughter stated yes.   Daughter was not amenable to antidepressant however patient herself stated that she would be willing to try.  I counseled pt on the fact that she is unabel to skip this medication and have it be efficaceous.   -     citalopram (CELEXA) 10 MG tablet; Take 1 tablet (10 mg) by mouth every morning  -     Basic metabolic panel **(2 WKS); Future - for Na assessment in elderly patient.     Benign essential hypertension  Improved BP on Losartan 50mg Qday and Metoprolol 50mg BID (started by PCP elsewhere. Also at home on hydrochlorothiazide (daughter does not want her to take this medication any lnger as she feels it makes her mother urinate quite a bit). We reviewed that that this medication usually does not cause increased urination however she was adamant and stated that she does not wish for her to take extra pills.   Ideally, we would be able to manage her BP with losartan alone single therapy to reduce polypharmacy.   Thus, I counseled her to STOP taking hydrochlorothiazide and measure BP daily in the morning for 1 week. Then to call clinic with these readings.   If the BP is 160 or higher, we will increase her losartan to 75mg Qday. If BP remains ~140/60-70 we will remain on Losartan 50mg Qday.   She does exhibit some lightheadedness at times thus we will toelrate BP of ~140/60-70.   -      Basic metabolic panel **(2 WKS); Future    Pt should return to clinic for f/u in 2-3 months   She should have aabs drawn in ~4 weeks time to assess Cr and Na levels in light of new medication.     Kristen Mcallister MD  Aug 22, 2019    Pt was seen and plan of care discussed with .  --------------------------------------------------------------------------------------------------------------------  SUBJECTIVE:  Shirley Lara is a 74 year old female with a PMHx of HTN, HLD and T2DM who presents for HTN follow up     I saw patient approximately 2 weeks ago at the time, we increased her losartan given her hypertension remained out of control with systolics in the 160s.  She is here today for follow-up of such.  In addition, daughter as well as patient states that she has no other significant concerns.  They do reiterate the p.o. intake has been a significant issue, and the patient has poor interest in spending time with her family or other visitors when they arrive at home.  In addition, today, a PHQ was completed which shows a markedly elevated PHQ score of 19.  They have been taking her blood pressure daily at home showing readings approximately in the 140 range over 60s to 70s diastolic.   Medications and allergies reviewed by me today.     ROS:   Constitutional, HEENT, cardiovascular, pulmonary, gi and gu systems are negative, except as otherwise noted.    OBJECTIVE:  BP (!) 145/78   Pulse 53   SpO2 99%    Wt Readings from Last 1 Encounters:   08/08/19 57.2 kg (126 lb)       GENERAL APPEARANCE: healthy, alert and no distress     RESP: lungs clear to auscultation - no rales, rhonchi or wheezes     CV: regular rates and rhythm, normal S1 S2, no S3 or S4 and no murmur, click or rub     PSYCH: patient appearance - well groomed, well nourished and interactive however intermittently tearfl when asked about her family in [place].    Pt was seen and examined with Dr. Mcallister.  I agree with her  documentation as noted above.    My additional comments: None    Yogesh Ley MD, MD

## 2019-08-22 NOTE — NURSING NOTE
Chief Complaint   Patient presents with     Diabetes     Pt is here to follow up on DM.      Susan Johns LPN at 1:55 PM on 8/22/2019.

## 2019-08-22 NOTE — PATIENT INSTRUCTIONS
It was a pleasure seeing you in clinic. Today we discussed:     1) Your blood pressure   It is better controlled based on your BP readings. Continue taking medications as prescribed.   If you wish to STOP taking the Hydrochlorothiazide medication (that makes you urinate)  It is OK to stop - however please take your BP daily in the morning everyday for 1 week while off.   If your BP is high, then we will increase your LOSARTAN further.     2) We started you on a low dose antidepressant. Please take this daily and do not miss pills. If you have side effects such as lightheadedness, dizziness, nausea, diarrhea, please call the clinic and you may stop the medication.     We will repeat lab studies in about four weeks You may have this done at any [facility] location close to home.     Sincerely,  Kristen Mcallister     Please follow up with me in 2-3 months given that we started a new medication.     Primary Care Center Medication Refill Request Information:  * Please contact your pharmacy regarding ANY request for medication refills.  ** [facility] Prescription Fax = [phone]  * Please allow 3 business days for routine medication refills.  * Please allow 5 business days for controlled substance medication refills.     Primary Care Center Test Result notification information:  *You will be notified with in 7-10 days of your appointment day regarding the results of your test.  If you are on MyChart you will be notified as soon as the provider has reviewed the results and signed off on them.    [facility]: [phone]          [facility]         Internal Medicine Resident                   Continuity Clinic    Who We Are    Resident Continuity Clinic is a part of the [facility] Primary Care [facility].  Resident physicians see patients independently and establish a relationship with them over the course of their three-year residency program.  As with the Primary Care Clinic, our Resident Continuity Clinic  models a group practice.  If your doctor is not available, you will be able to see another resident physician.  At the end of a resident s training, patients will be transitioned to a new resident physician for ongoing care.     We treat patients with a wide array of medical needs from routine physicals, to acute illnesses, to diabetes and blood pressure management, to complex medical illness.  What is a Resident Physician?    Resident physicians hold medical degrees and are doctors. They are training to become specialists in Internal Medicine. They work under the supervision of board-certified faculty physicians.  Expectations for Your Care    We strive to provide accessible, quality care at all times.    In order to provide this care, it is best to see your primary care resident doctor consistently rather switching between providers.  In the event you do see another physician, you should schedule a follow-up visit with your usual primary care doctor.    If you are transitioning your care from another clinic, it is helpful to have your records available for your doctor to review.    We do not prescribe controlled substances, such as ADD medications or narcotic pain medications, on your first visit.  We will review your health records and concerns prior to devising a treatment plan with you in order to provide the best care.      Clinic Services     Extended clinic hours; patient  to help navigate your visit;  parking; laboratory and imaging services with evening and weekend hours    Multiple medical and surgical specialties in one building    Complementary services, including Nutrition, Integrative Medicine, Pharmacy consultations, Mental and Behavioral Health, Sports Medicine and Physical Therapy    Thank You    We would like to thank you for choosing the [facility] Resident Continuity Clinic for your primary care. You are making a priceless contribution to the training  of the next generation of health care practitioners.     Contact us at [phone] for appointments or questions.    Resident Clinic Hours are Tuesdays and Thursdays, 7:30am-5:00pm    Residents   Ceasar Lara MD  (Male)   Dave Steven MD   (Male)   Leslye Hendricks MD  (Female)  Lulu Quezada MD   (Female)   Snehal Shepard MD   (Female)    Amador Deleon MD    (Female)   Bud Corado MD  (Male)   Alonso Case MD  (Male)    Courtney Thibodeaux MD  (Female)   Charlie Kim MD  (Female)   Laura Fitzpatrick MD    (Female)   Freedom Manjarrez MD  (Male)   Miguelangel Sanabria MD  (Male)   Fernando Urena MD  (Male)   INES Biggs MD   (Male)   Too Ferreira MD  (Male)    Sultana Smith MD (Female)   Tomi Quinonez MD  (Male)   Lucas Mcgregor MD  (Male)   Meghan Fagan MD  (Male)   Kristen Mcallister MD    (Female)   Joni Kilpatrick MD  (Female)     Supervising Physicians   MD Peewee Dorado MD Jill Bowman Peterson, MD Briar Duffy, MD James Langland, MD Mary Logeais, MD Tanya Melnik, MD Charles Moldow, MD Heather Thompson Buum, MD

## 2019-09-18 DIAGNOSIS — R09.81 NASAL CONGESTION: ICD-10-CM

## 2019-09-18 DIAGNOSIS — J30.2 SEASONAL ALLERGIC RHINITIS, UNSPECIFIED TRIGGER: Primary | ICD-10-CM

## 2019-09-20 RX ORDER — FLUTICASONE PROPIONATE 50 MCG
1 SPRAY, SUSPENSION (ML) NASAL DAILY
Qty: 16 ML | Refills: 11 | Status: SHIPPED | OUTPATIENT
Start: 2019-09-20 | End: 2020-09-01

## 2019-10-03 DIAGNOSIS — I10 ESSENTIAL HYPERTENSION: ICD-10-CM

## 2019-10-03 DIAGNOSIS — E11.9 TYPE 2 DIABETES MELLITUS WITHOUT COMPLICATION, WITHOUT LONG-TERM CURRENT USE OF INSULIN (H): ICD-10-CM

## 2019-10-09 RX ORDER — LOSARTAN POTASSIUM 25 MG/1
50 TABLET ORAL DAILY
Qty: 60 TABLET | Refills: 5 | Status: SHIPPED | OUTPATIENT
Start: 2019-10-09 | End: 2020-09-04

## 2019-10-09 NOTE — TELEPHONE ENCOUNTER
LOSARTAN POTASSIUM 25 MG TAB    Last Written Prescription Date:  8/8/2018  Last Fill Quantity: 30,   # refills: 3  Last Office Visit : 8/22/2019  Future Office visit:  None    Routing refill request to provider for review/approval because:  Blood pressure out of range   Per PCP noted from 8/22/2019    Pt due for a follow up appointment for Pt care and Lab draws.  Please see note below from PCP.      Last blood pressure under 140/90 in past 12 months          BP Readings from Last 3 Encounters:   08/22/19 (!) 145/78   08/08/19 (!) 148/74   07/06/18 138/69         Kristen Mcallister MD  Aug 22, 2019     Pt should return to clinic for f/u in 2-3 months   She should have aabs drawn in ~4 weeks time to assess Cr and Na levels in light of new medication.          Annia Rivera RN  Central Triage Red Flags/Med Refills

## 2019-11-18 DIAGNOSIS — F32.89 OTHER DEPRESSION: ICD-10-CM

## 2019-11-19 RX ORDER — CITALOPRAM HYDROBROMIDE 10 MG/1
10 TABLET ORAL EVERY MORNING
Qty: 90 TABLET | Refills: 0 | Status: SHIPPED | OUTPATIENT
Start: 2019-11-19 | End: 2020-03-06

## 2019-11-19 NOTE — TELEPHONE ENCOUNTER
Last Clinic Visit: 8/22/2019  [facility]  PHQ-9 addressed at this visit and medication initiated.  Pt overdue for follow-up visit - clinic notified

## 2019-12-07 DIAGNOSIS — E11.9 TYPE 2 DIABETES MELLITUS WITHOUT COMPLICATION, WITHOUT LONG-TERM CURRENT USE OF INSULIN (H): ICD-10-CM

## 2019-12-11 NOTE — TELEPHONE ENCOUNTER
METFORMIN HCL 1,000 MG TABLET  Last Written Prescription Date:  8/8/2019  Last Fill Quantity: 60,   # refills: 3  Last Office Visit : 8/22/2019  Future Office visit:  12/12/2019    Routing refill request to provider for review/approval because:  Blood pressure out of range.   08/22/19 (!) 145/78   08/08/19 (!) 148/74   07/06/18 138/69      Microalbumin due.  Labs in Care everywhere 7/31/2019:  HEMOGLOBIN A1C MONITORING (POCT) <=6.4 % 7.0High     LDL 3/19/2019=58.   Refilled for 30 days per protocol.

## 2019-12-12 ENCOUNTER — OFFICE VISIT (OUTPATIENT)
Dept: INTERNAL MEDICINE | Facility: CLINIC | Age: 74
End: 2019-12-12
Payer: COMMERCIAL

## 2019-12-12 VITALS
HEART RATE: 60 BPM | DIASTOLIC BLOOD PRESSURE: 81 MMHG | SYSTOLIC BLOOD PRESSURE: 141 MMHG | OXYGEN SATURATION: 98 % | WEIGHT: 129 LBS | BODY MASS INDEX: 27.68 KG/M2

## 2019-12-12 DIAGNOSIS — M19.90 OSTEOARTHRITIS, UNSPECIFIED OSTEOARTHRITIS TYPE, UNSPECIFIED SITE: ICD-10-CM

## 2019-12-12 DIAGNOSIS — D64.9 ANEMIA, UNSPECIFIED TYPE: ICD-10-CM

## 2019-12-12 DIAGNOSIS — Z23 NEED FOR VACCINATION: Primary | ICD-10-CM

## 2019-12-12 DIAGNOSIS — E11.9 TYPE 2 DIABETES MELLITUS WITHOUT COMPLICATION, WITHOUT LONG-TERM CURRENT USE OF INSULIN (H): ICD-10-CM

## 2019-12-12 DIAGNOSIS — K21.00 GASTROESOPHAGEAL REFLUX DISEASE WITH ESOPHAGITIS: ICD-10-CM

## 2019-12-12 LAB
BASOPHILS # BLD AUTO: 0 10E9/L (ref 0–0.2)
BASOPHILS NFR BLD AUTO: 0.5 %
DIFFERENTIAL METHOD BLD: ABNORMAL
EOSINOPHIL # BLD AUTO: 0.2 10E9/L (ref 0–0.7)
EOSINOPHIL NFR BLD AUTO: 2.9 %
ERYTHROCYTE [DISTWIDTH] IN BLOOD BY AUTOMATED COUNT: 13.1 % (ref 10–15)
FERRITIN SERPL-MCNC: 19 NG/ML (ref 8–252)
HBA1C MFR BLD: 7.1 % (ref 0–5.6)
HCT VFR BLD AUTO: 35 % (ref 35–47)
HGB BLD-MCNC: 11.5 G/DL (ref 11.7–15.7)
IMM GRANULOCYTES # BLD: 0 10E9/L (ref 0–0.4)
IMM GRANULOCYTES NFR BLD: 0.5 %
IRON SATN MFR SERPL: 16 % (ref 15–46)
IRON SERPL-MCNC: 55 UG/DL (ref 35–180)
LYMPHOCYTES # BLD AUTO: 1.7 10E9/L (ref 0.8–5.3)
LYMPHOCYTES NFR BLD AUTO: 29.4 %
MCH RBC QN AUTO: 28 PG (ref 26.5–33)
MCHC RBC AUTO-ENTMCNC: 32.9 G/DL (ref 31.5–36.5)
MCV RBC AUTO: 85 FL (ref 78–100)
MONOCYTES # BLD AUTO: 0.4 10E9/L (ref 0–1.3)
MONOCYTES NFR BLD AUTO: 6.7 %
NEUTROPHILS # BLD AUTO: 3.5 10E9/L (ref 1.6–8.3)
NEUTROPHILS NFR BLD AUTO: 60 %
NRBC # BLD AUTO: 0 10*3/UL
NRBC BLD AUTO-RTO: 0 /100
PLATELET # BLD AUTO: 229 10E9/L (ref 150–450)
RBC # BLD AUTO: 4.11 10E12/L (ref 3.8–5.2)
RETICS # AUTO: 78.1 10E9/L (ref 25–95)
RETICS/RBC NFR AUTO: 1.9 % (ref 0.5–2)
TIBC SERPL-MCNC: 346 UG/DL (ref 240–430)
WBC # BLD AUTO: 5.8 10E9/L (ref 4–11)

## 2019-12-12 RX ORDER — FAMOTIDINE 10 MG
10 TABLET ORAL 2 TIMES DAILY
Qty: 90 TABLET | Refills: 3 | Status: SHIPPED | OUTPATIENT
Start: 2019-12-12 | End: 2021-04-16

## 2019-12-12 RX ORDER — ACETAMINOPHEN 500 MG
500 TABLET ORAL EVERY 6 HOURS PRN
Qty: 30 TABLET | Refills: 3 | Status: SHIPPED | OUTPATIENT
Start: 2019-12-12 | End: 2020-09-04

## 2019-12-12 ASSESSMENT — PAIN SCALES - GENERAL: PAINLEVEL: MODERATE PAIN (4)

## 2019-12-12 NOTE — NURSING NOTE
Chief Complaint   Patient presents with     Recheck Medication     follow up on depression     Kimberly Nissen, EMT at 1:57 PM on 12/12/2019

## 2020-01-08 NOTE — PROGRESS NOTES
PRIMARY [facility]ARE CENTER     ASSESSMENT/PLAN:  Shirley Lara is a 74 year old female with a PMHx of HTN, HLD and T2DM who presents for medication check in.     Need for vaccination  -     Cancel: FLU VACCINE HIGH DOSE PRESERVATIVE FREE, AGE =>65 YR    Type 2 diabetes mellitus without complication, without long-term current use of insulin (H)  -     Hemoglobin A1c POCT; Future - checked today and appropriate   -     metFORMIN (GLUCOPHAGE) 1000 MG tablet; Take 1 tablet (1,000 mg) by mouth 2 times daily (with meals)    Osteoarthritis, unspecified osteoarthritis type, unspecified site  -     acetaminophen (TYLENOL) 500 MG tablet; Take 1 tablet (500 mg) by mouth every 6 hours as needed for mild pain    Gastroesophageal reflux disease with esophagitis  -     famotidine (PEPCID) 10 MG tablet; Take 1 tablet (10 mg) by mouth 2 times daily    Anemia, unspecified type  Noticeable and in elderly patient must r/o iron deficiency. May require colonoscopy.   -     CBC with platelets differential; Future  -     Iron and iron binding capacity; Future  -     Ferritin; Future  -     Reticulocyte count; Future    Other orders  -     FLU VACCINE HIGH DOSE PRESERVATIVE FREE, AGE =>65 YR    Pt should return to clinic for f/u with me LARS Mcallister MD  Dec 12, 2019    Pt was seen and plan of care discussed with Robert     I was present during the visit and the patient was seen and examined by me in conjunction with the resident.  I discussed the pertinent history, exam, results and plan with the resident and agree with the documentation above with the following exceptions: none.    Sophie Jones MD      --------------------------------------------------------------------------------------------------------------------------------------------  SUBJECTIVE:  Shirley Lara is a 74 year old female with a PMHx of HTN, HLD and T2DM who presents for medication check in.     States she has been  doing well. Family is still checking sugars many times a day wondering why it is elevated. I re-explained that checking randomly will not be fruitful for management. Explained that we will check A1c today.   Apart from this states she doesn't want to be on antidepressant. This was a family decision.     Medications and allergies reviewed by me today.     ROS:   Constitutional, HEENT, cardiovascular, pulmonary, gi and gu systems are negative, except as otherwise noted.    OBJECTIVE:    BP (!) 141/81   Pulse 60   Wt 58.5 kg (129 lb)   SpO2 98%   BMI 27.68 kg/m     Wt Readings from Last 1 Encounters:   12/12/19 58.5 kg (129 lb)       GENERAL APPEARANCE: healthy, alert and no distress     RESP: lungs clear to auscultation - no rales, rhonchi or wheezes     CV: regular rates and rhythm, normal S1 S2, no S3 or S4 and no murmur, click or rub     PSYCH: mentation appears normal. and affect normal/bright  LE: no edema   Neuro: AMbulating well on her own. Moving all extremities.

## 2020-02-10 DIAGNOSIS — E11.9 TYPE 2 DIABETES MELLITUS WITHOUT COMPLICATION, WITHOUT LONG-TERM CURRENT USE OF INSULIN (H): ICD-10-CM

## 2020-02-13 NOTE — TELEPHONE ENCOUNTER
METFORMIN HCL 1,000 MG TABLET    Last Written Prescription Date:  12/12/2019  Last Fill Quantity: 60,   # refills: 3  Last Office Visit : 12/12/2019  Future Office visit:  3/26/2020    Routing refill request to provider for review/approval because:  Labs Due:  LDL, Micro albumin, BMP    Blood pressure out of range     Change Med's??? Change B/P Dose?  Refer to clinic for review & Notify Padmini Ibrahim CMA for Labs    12/12/19 (!) 141/81   08/22/19 (!) 145/78   08/08/19 (!) 148/74       Annia Rivera RN  Central Triage Red Flags/Med Refills

## 2020-03-01 DIAGNOSIS — E11.9 TYPE 2 DIABETES MELLITUS WITHOUT COMPLICATION, WITHOUT LONG-TERM CURRENT USE OF INSULIN (H): ICD-10-CM

## 2020-03-05 DIAGNOSIS — F32.89 OTHER DEPRESSION: ICD-10-CM

## 2020-03-05 RX ORDER — METOPROLOL TARTRATE 50 MG
50 TABLET ORAL 2 TIMES DAILY
Qty: 180 TABLET | Refills: 0 | Status: SHIPPED | OUTPATIENT
Start: 2020-03-05 | End: 2021-04-16

## 2020-03-06 RX ORDER — CITALOPRAM HYDROBROMIDE 10 MG/1
10 TABLET ORAL EVERY MORNING
Qty: 90 TABLET | Refills: 0 | Status: SHIPPED | OUTPATIENT
Start: 2020-03-06 | End: 2020-06-02

## 2020-03-06 NOTE — TELEPHONE ENCOUNTER
Last Clinic Visit: 12/12/2019  [facility]  Overdue PHQ9- FYI to Clinic CMA  90 day RF to pharm  Next clinic Visit: 3-26-20

## 2020-03-24 DIAGNOSIS — E11.9 TYPE 2 DIABETES MELLITUS WITHOUT COMPLICATION, WITHOUT LONG-TERM CURRENT USE OF INSULIN (H): Primary | ICD-10-CM

## 2020-03-24 DIAGNOSIS — D64.9 LOW HEMOGLOBIN: ICD-10-CM

## 2020-05-30 DIAGNOSIS — F32.89 OTHER DEPRESSION: ICD-10-CM

## 2020-06-02 RX ORDER — CITALOPRAM HYDROBROMIDE 10 MG/1
10 TABLET ORAL EVERY MORNING
Qty: 90 TABLET | Refills: 0 | Status: SHIPPED | OUTPATIENT
Start: 2020-06-02 | End: 2020-09-04

## 2020-06-02 NOTE — TELEPHONE ENCOUNTER
CITALOPRAM HBR 10 MG TABLET    Last Written Prescription Date:  3/6/2020  Last Fill Quantity: 90,   # refills: 0  Last Office Visit : 12/12/2019  Future Office visit:  None    Routing refill request to provider for review/approval because:  PHQ-9 Needs updating?   6 months office visit due?  Nurse - this is a second refill request for medication with out or overdue labs. With last request pt was given 90 day gayle and Padmini notified.        Annia Rivera RN  Central Triage Red Flags/Med Refills

## 2020-08-31 DIAGNOSIS — M19.90 OSTEOARTHRITIS, UNSPECIFIED OSTEOARTHRITIS TYPE, UNSPECIFIED SITE: ICD-10-CM

## 2020-08-31 DIAGNOSIS — E11.9 TYPE 2 DIABETES MELLITUS WITHOUT COMPLICATION, WITHOUT LONG-TERM CURRENT USE OF INSULIN (H): ICD-10-CM

## 2020-08-31 DIAGNOSIS — I10 ESSENTIAL HYPERTENSION: ICD-10-CM

## 2020-08-31 DIAGNOSIS — F32.89 OTHER DEPRESSION: ICD-10-CM

## 2020-09-01 DIAGNOSIS — R09.81 NASAL CONGESTION: ICD-10-CM

## 2020-09-01 DIAGNOSIS — J30.2 SEASONAL ALLERGIC RHINITIS, UNSPECIFIED TRIGGER: ICD-10-CM

## 2020-09-01 RX ORDER — FLUTICASONE PROPIONATE 50 MCG
SPRAY, SUSPENSION (ML) NASAL
Qty: 16 ML | Refills: 1 | Status: SHIPPED | OUTPATIENT
Start: 2020-09-01 | End: 2020-11-30

## 2020-09-04 RX ORDER — PSEUDOEPHED/ACETAMINOPH/DIPHEN 30MG-500MG
TABLET ORAL
Qty: 30 TABLET | Refills: 3 | Status: SHIPPED | OUTPATIENT
Start: 2020-09-04

## 2020-09-04 RX ORDER — LOSARTAN POTASSIUM 25 MG/1
TABLET ORAL
Qty: 180 TABLET | Refills: 0 | Status: SHIPPED | OUTPATIENT
Start: 2020-09-04 | End: 2020-11-18

## 2020-09-04 RX ORDER — CITALOPRAM HYDROBROMIDE 10 MG/1
TABLET ORAL
Qty: 30 TABLET | Refills: 0 | Status: SHIPPED | OUTPATIENT
Start: 2020-09-04 | End: 2020-10-30

## 2020-09-04 NOTE — TELEPHONE ENCOUNTER
CITALOPRAM HBR 10 MG TABLET       Last Written Prescription Date:  6-2-20  Last Fill Quantity: 90,   # refills: 0  Last Office Visit : 12-19-20  Future Office visit:  none    Routing refill request to provider for review/approval because:  Overdue PHQ9   this is a second refill request for medication with out or overdue labs/tests. With last request pt was given 90 day gayle and Padmini notified.    LOSARTAN POTASSIUM 25 MG TAB       Last Written Prescription Date:  10-9-19  Last Fill Quantity: 60,   # refills: 5  Overdue lab Cr, K, -- FYI to clinic CMA  RF 90 day    ACETAMINOPHEN 500 MG TABLET   RF per protocol

## 2020-10-27 DIAGNOSIS — F32.89 OTHER DEPRESSION: ICD-10-CM

## 2020-10-30 NOTE — TELEPHONE ENCOUNTER
CITALOPRAM HBR 10 MG TABLET      Last Written Prescription Date:  9/4/20  Last Fill Quantity: 30,   # refills: 0  Last Office Visit : 12/12/19 with recommended 3 month follow up  Future Office visit:  None scheduled    Routing refill request to provider for review/approval because:  Overdue for PHQ-9 and follow up visit.  At last refill received limited supply without refills

## 2020-11-02 RX ORDER — CITALOPRAM HYDROBROMIDE 10 MG/1
10 TABLET ORAL EVERY MORNING
Qty: 15 TABLET | Refills: 0 | Status: SHIPPED | OUTPATIENT
Start: 2020-11-02 | End: 2020-11-30

## 2020-11-14 DIAGNOSIS — E11.9 TYPE 2 DIABETES MELLITUS WITHOUT COMPLICATION, WITHOUT LONG-TERM CURRENT USE OF INSULIN (H): ICD-10-CM

## 2020-11-14 DIAGNOSIS — I10 ESSENTIAL HYPERTENSION: ICD-10-CM

## 2020-11-18 RX ORDER — LOSARTAN POTASSIUM 25 MG/1
50 TABLET ORAL DAILY
Qty: 60 TABLET | Refills: 0 | Status: SHIPPED | OUTPATIENT
Start: 2020-11-18 | End: 2020-12-23

## 2020-11-18 NOTE — TELEPHONE ENCOUNTER
LOSARTAN POTASSIUM 25 MG TAB      Last Written Prescription Date:  9/4/20  Last Fill Quantity: 180,   # refills: 0  Last Office Visit : 12/12/19  Future Office visit:  None scheduled    Routing refill request to provider for review/approval because:  Blood pressure out of range   BP Readings from Last 3 Encounters:   12/12/19 (!) 141/81   08/22/19 (!) 145/78   08/08/19 (!) 148/74     Overdue for labs. 2nd refill request with overdue labs  Lab Test 08/08/19  1515   CR 0.89     Lab Test 08/08/19  1515   POTASSIUM 3.4   Nothing more recent in care everywhere nor media    Future orders in que

## 2020-11-22 DIAGNOSIS — F32.89 OTHER DEPRESSION: ICD-10-CM

## 2020-11-29 DIAGNOSIS — R09.81 NASAL CONGESTION: ICD-10-CM

## 2020-11-29 DIAGNOSIS — J30.2 SEASONAL ALLERGIC RHINITIS, UNSPECIFIED TRIGGER: ICD-10-CM

## 2020-11-30 RX ORDER — CITALOPRAM HYDROBROMIDE 10 MG/1
10 TABLET ORAL EVERY MORNING
Qty: 30 TABLET | Refills: 0 | Status: SHIPPED | OUTPATIENT
Start: 2020-11-30

## 2020-11-30 RX ORDER — FLUTICASONE PROPIONATE 50 MCG
1 SPRAY, SUSPENSION (ML) NASAL DAILY
Qty: 16 G | Refills: 0 | Status: SHIPPED | OUTPATIENT
Start: 2020-11-30 | End: 2021-02-05

## 2020-11-30 NOTE — TELEPHONE ENCOUNTER
citalopram (CELEXA) 10 MG tablet   Take 1 tablet (10 mg) by mouth every morning      Last Written Prescription Date: 11/2/20  Last Fill Quantity: 15,   # refills: 0  Last Office Visit : 12/12/19  Future Office visit:  none    Routing refill request to provider for review/approval because:  Overdue appointment and PHQ-9   At last refill received limited supply without refills    Scheduling has been notified to contact the pt for appointment.

## 2020-12-18 DIAGNOSIS — I10 ESSENTIAL HYPERTENSION: ICD-10-CM

## 2020-12-18 DIAGNOSIS — E11.9 TYPE 2 DIABETES MELLITUS WITHOUT COMPLICATION, WITHOUT LONG-TERM CURRENT USE OF INSULIN (H): ICD-10-CM

## 2020-12-23 RX ORDER — LOSARTAN POTASSIUM 25 MG/1
50 TABLET ORAL DAILY
Qty: 60 TABLET | Refills: 0 | Status: SHIPPED | OUTPATIENT
Start: 2020-12-23 | End: 2021-02-18

## 2020-12-23 NOTE — TELEPHONE ENCOUNTER
losartan (COZAAR) 25 MG tablet   Take 2 tablets (50 mg) by mouth daily      Last Written Prescription Date:  11/18/20  Last Fill Quantity: 60,   # refills: 0  Last Office Visit : 12/12/19  Future Office visit: none    Routing refill request to provider for review/approval because:  Overdue appointment/ BP and labs - creatinine and K+ . Future orders in que.    Nurse - this is a third refill request for medication with out or overdue labs. With last request pt was given 30 day Rachel notified.    Scheduling has been notified to contact the pt for appointment.

## 2020-12-31 DIAGNOSIS — F32.89 OTHER DEPRESSION: ICD-10-CM

## 2021-01-05 RX ORDER — CITALOPRAM HYDROBROMIDE 10 MG/1
10 TABLET ORAL EVERY MORNING
Qty: 15 TABLET | OUTPATIENT
Start: 2021-01-05

## 2021-01-05 NOTE — TELEPHONE ENCOUNTER
CITALOPRAM HBR 10 MG TABLET   Last Written Prescription Date:  11/30/2020  Last Fill Quantity: 30,   # refills: 0  Last Office Visit : 12/12/2019  Future Office visit:  None  Routing refill request to provider for review/approval because:  Third request,   Over due office visit  Dec 2019?  Is Pt still seeing one of our Providers for medication management?         Annia Rivera RN  Central Triage Red Flags/Med Refills

## 2021-01-24 DIAGNOSIS — F32.89 OTHER DEPRESSION: ICD-10-CM

## 2021-01-27 RX ORDER — CITALOPRAM HYDROBROMIDE 10 MG/1
TABLET ORAL
Qty: 30 TABLET | Refills: 0 | OUTPATIENT
Start: 2021-01-27

## 2021-02-01 DIAGNOSIS — J30.2 SEASONAL ALLERGIC RHINITIS, UNSPECIFIED TRIGGER: ICD-10-CM

## 2021-02-01 DIAGNOSIS — R09.81 NASAL CONGESTION: ICD-10-CM

## 2021-02-05 RX ORDER — FLUTICASONE PROPIONATE 50 MCG
1 SPRAY, SUSPENSION (ML) NASAL DAILY
Qty: 16 G | Refills: 0 | Status: SHIPPED | OUTPATIENT
Start: 2021-02-05

## 2021-02-05 NOTE — TELEPHONE ENCOUNTER
FLUTICASONE PROP 50 MCG SPRAY      Last Written Prescription Date:  11-30-20  Last Fill Quantity: 16 g,   # refills: 0  Last Office Visit : 12-12-19 ([facility])  Future Office visit:  none    Routing refill request to provider for review/approval because:  ? PCP, ? If pt has established care elsewhere    Scheduling has been notified to contact the pt for appointment.

## 2021-02-08 NOTE — TELEPHONE ENCOUNTER
Left voice message for patient with  to call back to schedule appointment in [facility] as last visit was 12- with Dr Jesenia Mcallister who already graduated. Patient will need to re-establish care with another provider for ongoing medication refill and follow up.

## 2021-02-21 ENCOUNTER — DOCUMENTATION ONLY (OUTPATIENT)
Dept: CARE COORDINATION | Facility: CLINIC | Age: 76
End: 2021-02-21

## 2021-04-16 ENCOUNTER — OFFICE VISIT (OUTPATIENT)
Dept: INTERNAL MEDICINE | Facility: CLINIC | Age: 76
End: 2021-04-16
Payer: COMMERCIAL

## 2021-04-16 DIAGNOSIS — I10 ESSENTIAL HYPERTENSION: ICD-10-CM

## 2021-04-16 DIAGNOSIS — E11.9 TYPE 2 DIABETES MELLITUS WITHOUT COMPLICATION, WITHOUT LONG-TERM CURRENT USE OF INSULIN (H): ICD-10-CM

## 2021-04-16 DIAGNOSIS — E78.5 HYPERLIPIDEMIA LDL GOAL <100: ICD-10-CM

## 2021-04-16 DIAGNOSIS — E55.9 VITAMIN D DEFICIENCY: ICD-10-CM

## 2021-04-16 DIAGNOSIS — D64.9 ANEMIA, UNSPECIFIED TYPE: Primary | ICD-10-CM

## 2021-04-16 DIAGNOSIS — K21.00 GASTROESOPHAGEAL REFLUX DISEASE WITH ESOPHAGITIS WITHOUT HEMORRHAGE: ICD-10-CM

## 2021-04-16 DIAGNOSIS — D64.9 ANEMIA, UNSPECIFIED TYPE: ICD-10-CM

## 2021-04-16 LAB
ALBUMIN SERPL-MCNC: 3.5 G/DL (ref 3.4–5)
ALP SERPL-CCNC: 87 U/L (ref 40–150)
ALT SERPL W P-5'-P-CCNC: 18 U/L (ref 0–50)
ANION GAP SERPL CALCULATED.3IONS-SCNC: 8 MMOL/L (ref 3–14)
AST SERPL W P-5'-P-CCNC: 7 U/L (ref 0–45)
BASOPHILS # BLD AUTO: 0 10E9/L (ref 0–0.2)
BASOPHILS NFR BLD AUTO: 0.4 %
BILIRUB SERPL-MCNC: 0.2 MG/DL (ref 0.2–1.3)
BUN SERPL-MCNC: 22 MG/DL (ref 7–30)
CALCIUM SERPL-MCNC: 9.4 MG/DL (ref 8.5–10.1)
CHLORIDE SERPL-SCNC: 105 MMOL/L (ref 94–109)
CHOLEST SERPL-MCNC: 238 MG/DL
CO2 SERPL-SCNC: 27 MMOL/L (ref 20–32)
CREAT SERPL-MCNC: 1.04 MG/DL (ref 0.52–1.04)
CREAT UR-MCNC: 105 MG/DL
DIFFERENTIAL METHOD BLD: ABNORMAL
EOSINOPHIL # BLD AUTO: 0.2 10E9/L (ref 0–0.7)
EOSINOPHIL NFR BLD AUTO: 3.1 %
ERYTHROCYTE [DISTWIDTH] IN BLOOD BY AUTOMATED COUNT: 12.9 % (ref 10–15)
FERRITIN SERPL-MCNC: 15 NG/ML (ref 8–252)
GFR SERPL CREATININE-BSD FRML MDRD: 53 ML/MIN/{1.73_M2}
GLUCOSE SERPL-MCNC: 108 MG/DL (ref 70–99)
HBA1C MFR BLD: 7.5 % (ref 0–5.6)
HCT VFR BLD AUTO: 34.3 % (ref 35–47)
HDLC SERPL-MCNC: 36 MG/DL
HGB BLD-MCNC: 11.3 G/DL (ref 11.7–15.7)
IMM GRANULOCYTES # BLD: 0 10E9/L (ref 0–0.4)
IMM GRANULOCYTES NFR BLD: 0.2 %
LDLC SERPL CALC-MCNC: ABNORMAL MG/DL
LYMPHOCYTES # BLD AUTO: 1.7 10E9/L (ref 0.8–5.3)
LYMPHOCYTES NFR BLD AUTO: 30.6 %
MCH RBC QN AUTO: 27.9 PG (ref 26.5–33)
MCHC RBC AUTO-ENTMCNC: 32.9 G/DL (ref 31.5–36.5)
MCV RBC AUTO: 85 FL (ref 78–100)
MICROALBUMIN UR-MCNC: 33 MG/L
MICROALBUMIN/CREAT UR: 31.05 MG/G CR (ref 0–25)
MONOCYTES # BLD AUTO: 0.3 10E9/L (ref 0–1.3)
MONOCYTES NFR BLD AUTO: 6.2 %
NEUTROPHILS # BLD AUTO: 3.3 10E9/L (ref 1.6–8.3)
NEUTROPHILS NFR BLD AUTO: 59.5 %
NONHDLC SERPL-MCNC: 202 MG/DL
NRBC # BLD AUTO: 0 10*3/UL
NRBC BLD AUTO-RTO: 0 /100
PLATELET # BLD AUTO: 242 10E9/L (ref 150–450)
POTASSIUM SERPL-SCNC: 3.9 MMOL/L (ref 3.4–5.3)
PROT SERPL-MCNC: 7.4 G/DL (ref 6.8–8.8)
RBC # BLD AUTO: 4.05 10E12/L (ref 3.8–5.2)
SODIUM SERPL-SCNC: 140 MMOL/L (ref 133–144)
T4 FREE SERPL-MCNC: 0.9 NG/DL (ref 0.76–1.46)
TRIGL SERPL-MCNC: 518 MG/DL
TSH SERPL DL<=0.005 MIU/L-ACNC: 4.34 MU/L (ref 0.4–4)
VIT B12 SERPL-MCNC: 188 PG/ML (ref 193–986)
WBC # BLD AUTO: 5.5 10E9/L (ref 4–11)

## 2021-04-16 PROCEDURE — 82728 ASSAY OF FERRITIN: CPT | Performed by: PATHOLOGY

## 2021-04-16 PROCEDURE — 84439 ASSAY OF FREE THYROXINE: CPT | Performed by: PATHOLOGY

## 2021-04-16 PROCEDURE — 82607 VITAMIN B-12: CPT | Performed by: PATHOLOGY

## 2021-04-16 PROCEDURE — 82043 UR ALBUMIN QUANTITATIVE: CPT | Performed by: PATHOLOGY

## 2021-04-16 PROCEDURE — 99000 SPECIMEN HANDLING OFFICE-LAB: CPT | Performed by: PATHOLOGY

## 2021-04-16 PROCEDURE — 80050 GENERAL HEALTH PANEL: CPT | Performed by: PATHOLOGY

## 2021-04-16 PROCEDURE — 80061 LIPID PANEL: CPT | Performed by: PATHOLOGY

## 2021-04-16 PROCEDURE — 82306 VITAMIN D 25 HYDROXY: CPT | Mod: 90 | Performed by: PATHOLOGY

## 2021-04-16 PROCEDURE — 83036 HEMOGLOBIN GLYCOSYLATED A1C: CPT | Performed by: PATHOLOGY

## 2021-04-16 PROCEDURE — 99214 OFFICE O/P EST MOD 30 MIN: CPT | Performed by: NURSE PRACTITIONER

## 2021-04-16 PROCEDURE — 36415 COLL VENOUS BLD VENIPUNCTURE: CPT | Performed by: PATHOLOGY

## 2021-04-16 RX ORDER — FAMOTIDINE 20 MG/1
20 TABLET, FILM COATED ORAL 2 TIMES DAILY
Qty: 180 TABLET | Refills: 0 | Status: SHIPPED | OUTPATIENT
Start: 2021-04-16

## 2021-04-16 RX ORDER — METOPROLOL TARTRATE 50 MG
50 TABLET ORAL 2 TIMES DAILY
Qty: 180 TABLET | Refills: 3 | Status: SHIPPED | OUTPATIENT
Start: 2021-04-16

## 2021-04-16 RX ORDER — OLOPATADINE HYDROCHLORIDE 1 MG/ML
1 SOLUTION/ DROPS OPHTHALMIC
COMMUNITY
Start: 2021-02-01

## 2021-04-16 RX ORDER — GLIMEPIRIDE 1 MG/1
1 TABLET ORAL DAILY
Qty: 90 TABLET | Refills: 0 | Status: SHIPPED | OUTPATIENT
Start: 2021-04-16

## 2021-04-16 RX ORDER — LOSARTAN POTASSIUM 50 MG/1
50 TABLET ORAL DAILY
Qty: 90 TABLET | Refills: 0 | Status: SHIPPED | OUTPATIENT
Start: 2021-04-16

## 2021-04-16 RX ORDER — HYDROCHLOROTHIAZIDE 12.5 MG/1
12.5 TABLET ORAL DAILY
Qty: 90 TABLET | Refills: 0 | Status: SHIPPED | OUTPATIENT
Start: 2021-04-16

## 2021-04-16 RX ORDER — CELECOXIB 100 MG/1
100 CAPSULE ORAL
COMMUNITY
Start: 2020-10-28

## 2021-04-16 NOTE — NURSING NOTE
Chief Complaint   Patient presents with     [facility]     re-establish     Kimberly Nissen, EMT at 1:03 PM on 4/16/2021

## 2021-04-16 NOTE — LETTER
Patient:  Shirley Lara  :   1945  MRN:     [id number]        Ms. Shirley Lara  [address]  [place] [address]        2021    Dear Ms. Lara,    Thank you for choosing the [facility] [place] for your healthcare needs.  We appreciate the opportunity to serve you.    The following are your recent test results.     Hi, [place],     Your urine screening shows some microalbumin, microscopic protein in the urine that can be an early sign of kidney damage related to the diabetes.     Continue to work with your provider at [facility] to keep working on your diabetes control. We would be happy to see you back if you would like to continue care here instead.     Thank you,   RAMONA Rothman CNP     Resulted Orders   Albumin Random Urine Quantitative with Creat Ratio   Result Value Ref Range    Creatinine Urine 105 mg/dL    Albumin Urine mg/L 33 mg/L    Albumin Urine mg/g Cr 31.05 (H) 0 - 25 mg/g Cr       Please contact your provider if you have any questions or concerns.  We look forward to serving your needs in the future.      Sincerely,        Primary Care Center Staff

## 2021-04-17 VITALS
SYSTOLIC BLOOD PRESSURE: 149 MMHG | BODY MASS INDEX: 27.89 KG/M2 | HEART RATE: 63 BPM | DIASTOLIC BLOOD PRESSURE: 78 MMHG | WEIGHT: 130 LBS | OXYGEN SATURATION: 98 %

## 2021-04-17 RX ORDER — UREA 10 %
500 LOTION (ML) TOPICAL DAILY
Qty: 90 TABLET | Refills: 1 | Status: SHIPPED | OUTPATIENT
Start: 2021-04-17

## 2021-04-18 LAB — DEPRECATED CALCIDIOL+CALCIFEROL SERPL-MC: 40 UG/L (ref 20–75)

## 2021-04-21 DIAGNOSIS — E11.9 TYPE 2 DIABETES MELLITUS WITHOUT COMPLICATION, WITHOUT LONG-TERM CURRENT USE OF INSULIN (H): ICD-10-CM

## 2021-04-21 DIAGNOSIS — E55.9 VITAMIN D DEFICIENCY: Primary | ICD-10-CM

## 2021-04-21 RX ORDER — CHOLECALCIFEROL (VITAMIN D3) 50 MCG
50 TABLET ORAL DAILY
Qty: 90 TABLET | Refills: 3 | Status: SHIPPED | OUTPATIENT
Start: 2021-04-21

## 2021-04-21 RX ORDER — ATORVASTATIN CALCIUM 20 MG/1
10 TABLET, FILM COATED ORAL DAILY
Qty: 90 TABLET | Refills: 3 | Status: SHIPPED | OUTPATIENT
Start: 2021-04-21 | End: 2021-05-03

## 2021-05-03 DIAGNOSIS — E11.9 TYPE 2 DIABETES MELLITUS WITHOUT COMPLICATION, WITHOUT LONG-TERM CURRENT USE OF INSULIN (H): ICD-10-CM

## 2021-05-03 NOTE — TELEPHONE ENCOUNTER
I was able to speak with Shirley today with the assistance of an  regarding her lab results.    Shirley will start the increased atorvastatin dose. She does plan to follow up with her PCP in [place], and she reported she has an appointment later today.    Evonne Chaves RN ([place]ras[place])

## 2021-05-04 RX ORDER — ATORVASTATIN CALCIUM 20 MG/1
20 TABLET, FILM COATED ORAL DAILY
Qty: 90 TABLET | Refills: 3 | Status: SHIPPED | OUTPATIENT
Start: 2021-05-04

## 2021-07-17 DIAGNOSIS — I10 ESSENTIAL HYPERTENSION: ICD-10-CM

## 2021-07-17 DIAGNOSIS — E11.9 TYPE 2 DIABETES MELLITUS WITHOUT COMPLICATION, WITHOUT LONG-TERM CURRENT USE OF INSULIN (H): ICD-10-CM

## 2021-07-20 RX ORDER — HYDROCHLOROTHIAZIDE 12.5 MG/1
TABLET ORAL
Qty: 90 TABLET | Refills: 0 | OUTPATIENT
Start: 2021-07-20

## 2021-07-20 RX ORDER — LOSARTAN POTASSIUM 50 MG/1
TABLET ORAL
Qty: 90 TABLET | Refills: 0 | OUTPATIENT
Start: 2021-07-20

## 2021-07-20 NOTE — TELEPHONE ENCOUNTER
"    LOSARTAN 50MG TABLETS  Last Written Prescription Date:  4/16/21  Last Fill Quantity: 90,   # refills: 0  HYDROCHLOROTHIAZIDE 12.5MG TABLETS     Last Written Prescription Date:  4./16/21  Last Fill Quantity: 90,   # refills: 0  Last Office Visit : 4/16/21  Future Office visit:  Schedule follow-up with PCP Dr. Dillard at [facility], or if she wants to continue coming here, she is welcome to do so. Reviewed it is helpful to maintain care in one clinic when possible to help improve continuity      Per Yemi note 4/16/21\"  Would like to continue receiving care there due to location, but missed a recent visit and rescheduled here for more urgent medication refills.  Does not intend to continue coming here long-term given the distance to this clinic.\"    04/16/21 (!) 149/78 ( [facility] appt)   12/12/19 (!) 141/81   08/22/19 (!) 145/78         "

## 2021-10-06 NOTE — PROGRESS NOTES
Assessment & Plan     Type 2 diabetes mellitus without complication, without long-term current use of insulin (H)  BGs at home 191 this morning. Her last A1c at outside clinic 7.3, per Care Everywhere. She plans to schedule follow-up with her PCP at [facility]. Encouraged her to do so within the next month given her multiple complaints. Primarily her daughter is worried about her PO intake, however, her weight has been stable, so this is reassuring. Would consider re-starting selective serotonin reuptake inhibitor, but it is unclear why she stopped this.  - glimepiride (AMARYL) 1 MG tablet; Take 1 tablet (1 mg) by mouth daily  - losartan (COZAAR) 50 MG tablet; Take 1 tablet (50 mg) by mouth daily  - metFORMIN (GLUCOPHAGE) 1000 MG tablet; Take 1 tablet (1,000 mg) by mouth 2 times daily (with meals)  - metoprolol tartrate (LOPRESSOR) 50 MG tablet; Take 1 tablet (50 mg) by mouth 2 times daily  - TSH with free T4 reflex; Future  - Hemoglobin A1c; Future  - Albumin Random Urine Quantitative with Creat Ratio    Gastroesophageal reflux disease with esophagitis  Refill provided.  - famotidine (PEPCID) 20 MG tablet; Take 1 tablet (20 mg) by mouth 2 times daily    Essential hypertension  No sign of orthostatic hypotension in clinic (values are elevated). Continue to monitor.  - hydrochlorothiazide (HYDRODIURIL) 12.5 MG tablet; Take 1 tablet (12.5 mg) by mouth daily  - losartan (COZAAR) 50 MG tablet; Take 1 tablet (50 mg) by mouth daily  - Comprehensive metabolic panel; Future    Anemia, unspecified type  Her daughter asks for Vitamin B12 supplementation to help improve Kubra's energy and feels this would also help improve the dizziness. Level is slightly low today, will start 500 mcg B12 supplement.   - CBC with platelets differential; Future  - Ferritin; Future  - Vitamin B12; Future    Vitamin D deficiency  Recheck Vitamin D levels, previously has required high-dose repletion therapy.  - Vitamin D Deficiency;  How Severe Is Your Eczema?: severe "Future    Hyperlipidemia LDL goal <100  Continue on Atorvastatin, refill provided.  - Lipid Profile NON-FASTING; Future    29 minutes spent on the date of the encounter doing chart review, history and exam, documentation and further activities per the note      Schedule follow-up with PCP Dr. Dillard at [facility], or if she wants to continue coming here, she is welcome to do so. Reviewed it is helpful to maintain care in one clinic when possible to help improve continuity.     RAMONA Rothman CNP  [facility] DENISSE Watson is a 75 year old who presents for the following health issues     HPI       Needing medication refills today. Has been going to an [facility] in [place].  Would like to continue receiving care there due to location, but missed a recent visit and rescheduled here for more urgent medication refills.  Does not intend to continue coming here long-term given the distance to this clinic.  DM--Metformin 1000 mg BID, Glimepiride 1 mg   \"sick 24/7\" \"doesn't eat, doesn't listen.\" Daughter tries to encourage her to eat but she says \"I can't,\" doesn't feel like it.  Weight has been stable.   Joints are hurting. Pale. They're worried she just doesn't eat enough. Sometimes feels a little nauseated, no abdominal pain. Vomits occasionally, maybe every 1-2 weeks.    HTN--Losartan 50 mg, Metoprolol 50 mg, hydrochlorothiazide 12.5 mg daily.  Blood pressure today 151/86 at home.    Atorvastatin 10 mg daily.    GERD--Famotidine 20 mg BID.  Vitamin D3 50 mcg daily.  Vitamin B12 asks about if this would help her be more stable/steady. Feels dizzy, like the room is spinning. Had a fall last year. Dizziness comes on suddenly, sitting or standing. Not eating fruits or vegetables.     Mood--daughter thinks she is depressed, doesn't like to be around people, doesn't want to participate in family activities, \"I don't have anything to say.\" Took Citalopram 2-3 " Is This A New Presentation, Or A Follow-Up?: Rash times and stopped, not sure why.     Review of Systems   Constitutional, HEENT, cardiovascular, pulmonary, gi and gu systems are negative, except as otherwise noted.      Objective    BP (!) 149/78 (Patient Position: Standing)   Pulse 63   Wt 59 kg (130 lb)   SpO2 98%   BMI 27.89 kg/m    Body mass index is 27.89 kg/m .  Physical Exam   GENERAL: healthy, alert and no distress  EYES: Eyes grossly normal to inspection, PERRL and conjunctivae and sclerae normal  HENT: ear canals and TM's normal, nose and mouth without ulcers or lesions  RESP: lungs clear to auscultation - no rales, rhonchi or wheezes  CV: regular rate and rhythm, normal S1 S2, no S3 or S4, no murmur, click or rub, no peripheral edema and peripheral pulses strong  ABDOMEN: soft, nontender, no hepatosplenomegaly, no masses and bowel sounds normal  MS: no gross musculoskeletal defects noted, no edema  NEURO: Normal strength and tone, mentation intact and speech normal  PSYCH: mentation appears normal and affect flat

## 2022-02-14 DIAGNOSIS — E11.9 TYPE 2 DIABETES MELLITUS WITHOUT COMPLICATION, WITHOUT LONG-TERM CURRENT USE OF INSULIN (H): ICD-10-CM

## 2022-02-17 RX ORDER — ATORVASTATIN CALCIUM 20 MG/1
20 TABLET, FILM COATED ORAL DAILY
Qty: 90 TABLET | OUTPATIENT
Start: 2022-02-17

## 2022-02-17 NOTE — TELEPHONE ENCOUNTER
atorvastatin (LIPITOR) 20 MG tablet   Take 1 tablet (20 mg) by mouth daily      Last Written Prescription Date:  5/4/21  Last Fill Quantity: 90,   # refills: 3  Last Office Visit : 4/16/21  Future Office visit:  none    Routing refill request to provider for review/approval because:  Early refill requests.     8/16/21 LDL RECENT OUTSIDE LABS AVAILABLE IN THE LAB TAB OR CARE EVERYWHERE.

## 2022-02-17 NOTE — CONFIDENTIAL NOTE
Patient had medications refilled 2/14 through [facility]. Also, requested early. Patient still has fills on file.  Aisha Cantor RN

## 2022-03-23 NOTE — TELEPHONE ENCOUNTER
METOPROLOL TARTRATE 50 MG TAB      Last Written Prescription Date:  8/8/19  Last Fill Quantity: 60,   # refills: 3  Last Office Visit : 12/12/19  Future Office visit:  None scheduled    Routing refill request to provider for review/approval because:  Blood pressure out of range   BP Readings from Last 3 Encounters:   12/12/19 (!) 141/81   08/22/19 (!) 145/78   08/08/19 (!) 148/74     Per protocol, 90 day RX provided, routed to provider for follow up         Benzoyl Peroxide Pregnancy And Lactation Text: This medication is Pregnancy Category C. It is unknown if benzoyl peroxide is excreted in breast milk.

## 2022-04-21 DIAGNOSIS — D64.9 ANEMIA, UNSPECIFIED TYPE: ICD-10-CM

## 2022-04-25 DIAGNOSIS — D64.9 ANEMIA, UNSPECIFIED TYPE: ICD-10-CM

## 2022-04-25 RX ORDER — UREA 10 %
LOTION (ML) TOPICAL
Qty: 90 TABLET | Refills: 1 | OUTPATIENT
Start: 2022-04-25

## 2022-04-25 NOTE — TELEPHONE ENCOUNTER
"Refill request for cyanocobalamin (VITAMIN B-12) 500 MCG tablet refused and notified pharmacy electronically.  Also requested please forward to Peter Dillard [facility]      Recent primary care visits at [facility] list Peter Dillard [facility] as primary.       Also per chart, reviewed chart entry from 7/17/2021 prior med's refused with comments  \"Per Yemi note 4/16/21\"  Would like to continue receiving care there due to location, but missed a recent visit and rescheduled here for more urgent medication refills.  Does not intend to continue coming here long-term given the distance to this clinic.\"  "